# Patient Record
Sex: FEMALE | Race: WHITE | NOT HISPANIC OR LATINO | Employment: OTHER | URBAN - METROPOLITAN AREA
[De-identification: names, ages, dates, MRNs, and addresses within clinical notes are randomized per-mention and may not be internally consistent; named-entity substitution may affect disease eponyms.]

---

## 2017-01-11 ENCOUNTER — DOCUMENTATION ONLY (OUTPATIENT)
Dept: PHYSICAL THERAPY | Facility: CLINIC | Age: 67
End: 2017-01-11

## 2017-01-11 NOTE — PROGRESS NOTES
Abiola SUTHERLAND Gabriel  1950  Diagnosis - Left shoulder adhesive capsulitis manipulation under anesthesia Physical Therapy Progress Note  (Information from 08/24/16)   Signing Clinician's Name / Credentials   Signing clinician's name / credentials Vishnu Serrato, PT, DPT   Session Number   Session Number 13 (Start of Care Date - 6/30/2016)   Progress Note/Recertification   Progress Note Due Date 08/28/16   Progress Note Completed Date 08/24/16   Recertification Due Date 09/30/16   PT Medicare Only G-code   G-code Carry: Carrying, Moving & Handling Objects   Carry: Carrying, Moving & Handling Objects   Carry Current Status,  (eval/re-eval & every progress note) CI: 1-19% impairment   Current Carry Modifier Rationale Based on clinical judgment and the patient's ability to perform transfers, perform overhead activities, and perform exercises within PT session. Also based on the patient's subjective report of ability to perform functional activities.   Carry Goal,  (eval/re-eval, every progress note, & discharge) CI: 1-19% impairment   Adult Goals   PT Ortho Eval Goals 1;2;3;4;5   Ortho Goal 1   Goal Description wash hair using L UE  in typical manner with little or no difficulty in order to retrun to previous level of funciton   Target Date 08/30/16   Date Met 07/15/16   Ortho Goal 2   Goal Description dress self using L UE in typical manner with little or no difficulty in order to retrun to previous level of function   Target Date 08/30/16   Date Met 08/24/16   Ortho Goal 3   Goal Description Increase AROM to 170* shoulder flxn in order to reach items from upper cupboard  (Goal not met)   Target Date 09/30/16   Ortho Goal 4   Goal Description increase strengh in L UE to 4/5 in order to reach items on an upper shelf and return to previous level of function  (Goal not met)   Target Date 09/30/16   Ortho Goal 5   Goal Description Independent in HEP to continue to strengthen and stretch on own   Target Date 08/30/16    Date Met 07/15/16   Subjective Report   Subjective Report No issues with shoulder over the weekend. Still gets tired with overhead movements. 0/10 pain currently.    Objective Measure 2   Objective Measure AROM   Details IR - Right T6 and Left - T7 / Flexion - 156 (Right - 165) / Abduction - 155 (Right - 170)    Objective Measure 3   Objective Measure PROM   Details ER - 90    Objective Measure 4   Objective Measure Strength   Details Flexion - 4+/5 / Abduction - 4/5 / ER - 4/5   Therapeutic Procedure/exercise   Minutes 29   Skilled Intervention ROM and strengthening exericses.   Patient Response Performs all exercises well with minimal cueing required.   Treatment Detail Scaption x10 with 1# / Pulleys for flexion and abduction x5 minutes / ER in standing with Blue TB 2x15 / PROM for abduction x8 minutes / Wall walks in abduction x10 with 10 second holds / Lat stretch 3x30 seconds / Rows x25 with black TB     Plan   Home program Supine shoulder flexion / AROM for scaption / LS Stretch / Rows / Codman's / ER with cane for AAROM / Sidelying abduction / Wall walks   Plan for next session (From 1/11/2016) Discharge from PT   Comments   Comments  (From 1/11/2016) Pt was doing very well in terms of strength, ROM, and pain levels at her last visit. Pt had made progress towards all PT goals but still had not met all of them. Pt would have benefited from additional skilled PT in order to improve overall strength and ROM to complete all functional tasks. Pt has not returned to PT and it is assumed that she is doing well independently. Pt is discharged to [facility].      Referring Physician - Tyrell Pimentel

## 2017-06-01 ENCOUNTER — TRANSFERRED RECORDS (OUTPATIENT)
Dept: HEALTH INFORMATION MANAGEMENT | Facility: CLINIC | Age: 67
End: 2017-06-01

## 2017-06-26 ENCOUNTER — ANESTHESIA EVENT (OUTPATIENT)
Dept: SURGERY | Facility: CLINIC | Age: 67
DRG: 470 | End: 2017-06-26
Payer: MEDICARE

## 2017-06-26 NOTE — ANESTHESIA PREPROCEDURE EVALUATION
Anesthesia Evaluation     . Pt has had prior anesthetic.     No history of anesthetic complications          ROS/MED HX    ENT/Pulmonary:     (+)tobacco use, Past use , . Other pulmonary disease pulmonary nodules.    Neurologic:     (+)other neuro AVM    Cardiovascular:     (+) Dyslipidemia, ----. : . . . :. . Previous cardiac testing date:results:date: results:ECG reviewed date:8/18/13 results:SR date: results:          METS/Exercise Tolerance:  >4 METS   Hematologic:  - neg hematologic  ROS       Musculoskeletal:   (+) , , other musculoskeletal- left hip DJD      GI/Hepatic:     (+) GERD Other GI/Hepatic IBS      Renal/Genitourinary:         Endo:     (+) thyroid problem Other Endocrine Disorder Lupus.      Psychiatric:  - neg psychiatric ROS       Infectious Disease:  - neg infectious disease ROS       Malignancy:      - no malignancy   Other: Comment: glaucoma                    Physical Exam  Normal systems: cardiovascular, pulmonary and dental    Airway   Mallampati: I  TM distance: >3 FB  Neck ROM: full    Dental     Cardiovascular       Pulmonary                     Anesthesia Plan      History & Physical Review  History and physical reviewed and following examination; no interval change.    ASA Status:  3 .    NPO Status:  > 8 hours    Plan for Spinal and General with Intravenous and Propofol induction. Maintenance will be Balanced.    PONV prophylaxis:  Ondansetron (or other 5HT-3) and Dexamethasone or Solumedrol       Postoperative Care      Consents  Anesthetic plan, risks, benefits and alternatives discussed with:  Patient and Spouse..                          .

## 2017-06-28 ENCOUNTER — APPOINTMENT (OUTPATIENT)
Dept: GENERAL RADIOLOGY | Facility: CLINIC | Age: 67
DRG: 470 | End: 2017-06-28
Attending: ORTHOPAEDIC SURGERY
Payer: MEDICARE

## 2017-06-28 ENCOUNTER — ANESTHESIA (OUTPATIENT)
Dept: SURGERY | Facility: CLINIC | Age: 67
DRG: 470 | End: 2017-06-28
Payer: MEDICARE

## 2017-06-28 ENCOUNTER — HOSPITAL ENCOUNTER (INPATIENT)
Facility: CLINIC | Age: 67
LOS: 3 days | Discharge: HOME | DRG: 470 | End: 2017-07-01
Attending: ORTHOPAEDIC SURGERY | Admitting: ORTHOPAEDIC SURGERY
Payer: MEDICARE

## 2017-06-28 DIAGNOSIS — Z96.642 STATUS POST LEFT HIP REPLACEMENT: Primary | ICD-10-CM

## 2017-06-28 DIAGNOSIS — D64.9 ANEMIA, UNSPECIFIED TYPE: ICD-10-CM

## 2017-06-28 PROBLEM — M79.10 MUSCLE PAIN: Status: ACTIVE | Noted: 2017-06-28

## 2017-06-28 PROBLEM — M16.9 DEGENERATIVE JOINT DISEASE (DJD) OF HIP: Status: RESOLVED | Noted: 2017-06-28 | Resolved: 2017-06-28

## 2017-06-28 PROBLEM — M16.9 DEGENERATIVE JOINT DISEASE (DJD) OF HIP: Status: ACTIVE | Noted: 2017-06-28

## 2017-06-28 LAB
BASOPHILS # BLD AUTO: 0 10E9/L (ref 0–0.2)
BASOPHILS NFR BLD AUTO: 0.8 %
CREAT SERPL-MCNC: 0.96 MG/DL (ref 0.52–1.04)
DIFFERENTIAL METHOD BLD: NORMAL
EOSINOPHIL # BLD AUTO: 0.1 10E9/L (ref 0–0.7)
EOSINOPHIL NFR BLD AUTO: 1.7 %
ERYTHROCYTE [DISTWIDTH] IN BLOOD BY AUTOMATED COUNT: 12.6 % (ref 10–15)
GFR SERPL CREATININE-BSD FRML MDRD: 58 ML/MIN/1.7M2
HCT VFR BLD AUTO: 40 % (ref 35–47)
HGB BLD-MCNC: 13 G/DL (ref 11.7–15.7)
IMM GRANULOCYTES # BLD: 0 10E9/L (ref 0–0.4)
IMM GRANULOCYTES NFR BLD: 0.2 %
INR PPP: 0.97 (ref 0.86–1.14)
LYMPHOCYTES # BLD AUTO: 1.7 10E9/L (ref 0.8–5.3)
LYMPHOCYTES NFR BLD AUTO: 33.1 %
MCH RBC QN AUTO: 29.3 PG (ref 26.5–33)
MCHC RBC AUTO-ENTMCNC: 32.5 G/DL (ref 31.5–36.5)
MCV RBC AUTO: 90 FL (ref 78–100)
MONOCYTES # BLD AUTO: 0.4 10E9/L (ref 0–1.3)
MONOCYTES NFR BLD AUTO: 7.5 %
NEUTROPHILS # BLD AUTO: 3 10E9/L (ref 1.6–8.3)
NEUTROPHILS NFR BLD AUTO: 56.7 %
PLATELET # BLD AUTO: 213 10E9/L (ref 150–450)
RBC # BLD AUTO: 4.44 10E12/L (ref 3.8–5.2)
WBC # BLD AUTO: 5.2 10E9/L (ref 4–11)

## 2017-06-28 PROCEDURE — 37000008 ZZH ANESTHESIA TECHNICAL FEE, 1ST 30 MIN: Performed by: ORTHOPAEDIC SURGERY

## 2017-06-28 PROCEDURE — 85025 COMPLETE CBC W/AUTO DIFF WBC: CPT | Performed by: PHYSICIAN ASSISTANT

## 2017-06-28 PROCEDURE — 36000093 ZZH SURGERY LEVEL 4 1ST 30 MIN: Performed by: ORTHOPAEDIC SURGERY

## 2017-06-28 PROCEDURE — 25000132 ZZH RX MED GY IP 250 OP 250 PS 637: Mod: GY | Performed by: ORTHOPAEDIC SURGERY

## 2017-06-28 PROCEDURE — 40000985 XR PELVIS PORT 1/2 VW

## 2017-06-28 PROCEDURE — 25000132 ZZH RX MED GY IP 250 OP 250 PS 637: Mod: GY | Performed by: PHYSICIAN ASSISTANT

## 2017-06-28 PROCEDURE — 25000125 ZZHC RX 250: Performed by: NURSE ANESTHETIST, CERTIFIED REGISTERED

## 2017-06-28 PROCEDURE — 0SRB04A REPLACEMENT OF LEFT HIP JOINT WITH CERAMIC ON POLYETHYLENE SYNTHETIC SUBSTITUTE, UNCEMENTED, OPEN APPROACH: ICD-10-PCS | Performed by: ORTHOPAEDIC SURGERY

## 2017-06-28 PROCEDURE — 36000063 ZZH SURGERY LEVEL 4 EA 15 ADDTL MIN: Performed by: ORTHOPAEDIC SURGERY

## 2017-06-28 PROCEDURE — 25000128 H RX IP 250 OP 636: Performed by: PHYSICIAN ASSISTANT

## 2017-06-28 PROCEDURE — 40000305 ZZH STATISTIC PRE PROC ASSESS I: Performed by: ORTHOPAEDIC SURGERY

## 2017-06-28 PROCEDURE — 12000007 ZZH R&B INTERMEDIATE

## 2017-06-28 PROCEDURE — 85610 PROTHROMBIN TIME: CPT | Performed by: PHYSICIAN ASSISTANT

## 2017-06-28 PROCEDURE — 36415 COLL VENOUS BLD VENIPUNCTURE: CPT | Performed by: PHYSICIAN ASSISTANT

## 2017-06-28 PROCEDURE — C1776 JOINT DEVICE (IMPLANTABLE): HCPCS | Performed by: ORTHOPAEDIC SURGERY

## 2017-06-28 PROCEDURE — 25000128 H RX IP 250 OP 636: Performed by: NURSE ANESTHETIST, CERTIFIED REGISTERED

## 2017-06-28 PROCEDURE — 82565 ASSAY OF CREATININE: CPT | Performed by: ORTHOPAEDIC SURGERY

## 2017-06-28 PROCEDURE — A9270 NON-COVERED ITEM OR SERVICE: HCPCS | Mod: GY | Performed by: PHYSICIAN ASSISTANT

## 2017-06-28 PROCEDURE — 27210794 ZZH OR GENERAL SUPPLY STERILE: Performed by: ORTHOPAEDIC SURGERY

## 2017-06-28 PROCEDURE — 40000986 XR PELVIS PORT 1/2 VW

## 2017-06-28 PROCEDURE — 25000128 H RX IP 250 OP 636: Performed by: FAMILY MEDICINE

## 2017-06-28 PROCEDURE — 71000014 ZZH RECOVERY PHASE 1 LEVEL 2 FIRST HR: Performed by: ORTHOPAEDIC SURGERY

## 2017-06-28 PROCEDURE — 25000128 H RX IP 250 OP 636: Performed by: ORTHOPAEDIC SURGERY

## 2017-06-28 PROCEDURE — A9270 NON-COVERED ITEM OR SERVICE: HCPCS | Mod: GY | Performed by: ORTHOPAEDIC SURGERY

## 2017-06-28 PROCEDURE — 37000009 ZZH ANESTHESIA TECHNICAL FEE, EACH ADDTL 15 MIN: Performed by: ORTHOPAEDIC SURGERY

## 2017-06-28 DEVICE — IMP STEM FEMORAL HIP STRK ACCOLADE II 127DEG SZ 5 6721-0535: Type: IMPLANTABLE DEVICE | Site: HIP | Status: FUNCTIONAL

## 2017-06-28 DEVICE — IMP SHELL ACETABULUM HOWM 48MM 542-11-48D: Type: IMPLANTABLE DEVICE | Site: HIP | Status: FUNCTIONAL

## 2017-06-28 DEVICE — IMP HEAD FEMORAL STRK BIOLOX DELTA CERAMIC 32MM 0MM: Type: IMPLANTABLE DEVICE | Site: HIP | Status: FUNCTIONAL

## 2017-06-28 DEVICE — IMP SCR BONE STRK TORX 6.5X30MM CAN 2030-6530-1: Type: IMPLANTABLE DEVICE | Site: HIP | Status: FUNCTIONAL

## 2017-06-28 DEVICE — IMP SCR BONE STRK TORX 6.5X25MM CAN 2030-6525-1: Type: IMPLANTABLE DEVICE | Site: HIP | Status: FUNCTIONAL

## 2017-06-28 DEVICE — IMP LINER STRK TRIDENT X3 POLY 32MM 10DEG SZ D 623-10-32D: Type: IMPLANTABLE DEVICE | Site: HIP | Status: FUNCTIONAL

## 2017-06-28 RX ORDER — GLYCOPYRROLATE 0.2 MG/ML
INJECTION, SOLUTION INTRAMUSCULAR; INTRAVENOUS PRN
Status: DISCONTINUED | OUTPATIENT
Start: 2017-06-28 | End: 2017-06-28

## 2017-06-28 RX ORDER — METOCLOPRAMIDE 5 MG/1
5 TABLET ORAL EVERY 6 HOURS PRN
Status: DISCONTINUED | OUTPATIENT
Start: 2017-06-28 | End: 2017-07-01 | Stop reason: HOSPADM

## 2017-06-28 RX ORDER — BUPIVACAINE HYDROCHLORIDE 7.5 MG/ML
INJECTION, SOLUTION INTRASPINAL PRN
Status: DISCONTINUED | OUTPATIENT
Start: 2017-06-28 | End: 2017-06-28

## 2017-06-28 RX ORDER — CEFAZOLIN SODIUM 2 G/100ML
2 INJECTION, SOLUTION INTRAVENOUS
Status: COMPLETED | OUTPATIENT
Start: 2017-06-28 | End: 2017-06-28

## 2017-06-28 RX ORDER — METOPROLOL TARTRATE 1 MG/ML
1-2 INJECTION, SOLUTION INTRAVENOUS EVERY 5 MIN PRN
Status: DISCONTINUED | OUTPATIENT
Start: 2017-06-28 | End: 2017-06-28 | Stop reason: HOSPADM

## 2017-06-28 RX ORDER — CYCLOBENZAPRINE HCL 5 MG
5 TABLET ORAL 3 TIMES DAILY PRN
Status: DISCONTINUED | OUTPATIENT
Start: 2017-06-28 | End: 2017-07-01 | Stop reason: HOSPADM

## 2017-06-28 RX ORDER — SODIUM CHLORIDE, SODIUM LACTATE, POTASSIUM CHLORIDE, CALCIUM CHLORIDE 600; 310; 30; 20 MG/100ML; MG/100ML; MG/100ML; MG/100ML
INJECTION, SOLUTION INTRAVENOUS CONTINUOUS
Status: DISCONTINUED | OUTPATIENT
Start: 2017-06-28 | End: 2017-06-28 | Stop reason: HOSPADM

## 2017-06-28 RX ORDER — HYDROMORPHONE HYDROCHLORIDE 1 MG/ML
.3-.5 INJECTION, SOLUTION INTRAMUSCULAR; INTRAVENOUS; SUBCUTANEOUS EVERY 5 MIN PRN
Status: DISCONTINUED | OUTPATIENT
Start: 2017-06-28 | End: 2017-06-28 | Stop reason: HOSPADM

## 2017-06-28 RX ORDER — EPHEDRINE SULFATE 50 MG/ML
INJECTION, SOLUTION INTRAVENOUS PRN
Status: DISCONTINUED | OUTPATIENT
Start: 2017-06-28 | End: 2017-06-28

## 2017-06-28 RX ORDER — DIPHENHYDRAMINE HCL 12.5MG/5ML
12.5 LIQUID (ML) ORAL EVERY 6 HOURS PRN
Status: DISCONTINUED | OUTPATIENT
Start: 2017-06-28 | End: 2017-07-01 | Stop reason: HOSPADM

## 2017-06-28 RX ORDER — LATANOPROST 50 UG/ML
1 SOLUTION/ DROPS OPHTHALMIC DAILY
Status: DISCONTINUED | OUTPATIENT
Start: 2017-06-28 | End: 2017-07-01 | Stop reason: HOSPADM

## 2017-06-28 RX ORDER — DEXAMETHASONE SODIUM PHOSPHATE 4 MG/ML
INJECTION, SOLUTION INTRA-ARTICULAR; INTRALESIONAL; INTRAMUSCULAR; INTRAVENOUS; SOFT TISSUE PRN
Status: DISCONTINUED | OUTPATIENT
Start: 2017-06-28 | End: 2017-06-28

## 2017-06-28 RX ORDER — LIDOCAINE 40 MG/G
CREAM TOPICAL
Status: DISCONTINUED | OUTPATIENT
Start: 2017-06-28 | End: 2017-07-01 | Stop reason: HOSPADM

## 2017-06-28 RX ORDER — ONDANSETRON 4 MG/1
4 TABLET, ORALLY DISINTEGRATING ORAL EVERY 6 HOURS PRN
Status: DISCONTINUED | OUTPATIENT
Start: 2017-06-28 | End: 2017-07-01 | Stop reason: HOSPADM

## 2017-06-28 RX ORDER — ACETAMINOPHEN 325 MG/1
975 TABLET ORAL EVERY 8 HOURS
Status: COMPLETED | OUTPATIENT
Start: 2017-06-28 | End: 2017-07-01

## 2017-06-28 RX ORDER — DIPHENHYDRAMINE HYDROCHLORIDE 50 MG/ML
12.5 INJECTION INTRAMUSCULAR; INTRAVENOUS EVERY 6 HOURS PRN
Status: DISCONTINUED | OUTPATIENT
Start: 2017-06-28 | End: 2017-07-01 | Stop reason: HOSPADM

## 2017-06-28 RX ORDER — HYDROMORPHONE HYDROCHLORIDE 1 MG/ML
.3-.5 INJECTION, SOLUTION INTRAMUSCULAR; INTRAVENOUS; SUBCUTANEOUS
Status: DISCONTINUED | OUTPATIENT
Start: 2017-06-28 | End: 2017-07-01 | Stop reason: HOSPADM

## 2017-06-28 RX ORDER — ONDANSETRON 4 MG/1
4 TABLET, ORALLY DISINTEGRATING ORAL EVERY 30 MIN PRN
Status: DISCONTINUED | OUTPATIENT
Start: 2017-06-28 | End: 2017-06-28 | Stop reason: HOSPADM

## 2017-06-28 RX ORDER — ONDANSETRON 2 MG/ML
4 INJECTION INTRAMUSCULAR; INTRAVENOUS EVERY 6 HOURS PRN
Status: DISCONTINUED | OUTPATIENT
Start: 2017-06-28 | End: 2017-07-01 | Stop reason: HOSPADM

## 2017-06-28 RX ORDER — NALOXONE HYDROCHLORIDE 0.4 MG/ML
.1-.4 INJECTION, SOLUTION INTRAMUSCULAR; INTRAVENOUS; SUBCUTANEOUS
Status: DISCONTINUED | OUTPATIENT
Start: 2017-06-28 | End: 2017-06-28

## 2017-06-28 RX ORDER — ONDANSETRON 2 MG/ML
INJECTION INTRAMUSCULAR; INTRAVENOUS PRN
Status: DISCONTINUED | OUTPATIENT
Start: 2017-06-28 | End: 2017-06-28

## 2017-06-28 RX ORDER — AMOXICILLIN 250 MG
1-2 CAPSULE ORAL 2 TIMES DAILY
Status: DISCONTINUED | OUTPATIENT
Start: 2017-06-28 | End: 2017-07-01 | Stop reason: HOSPADM

## 2017-06-28 RX ORDER — HYDROXYZINE HYDROCHLORIDE 25 MG/1
25 TABLET, FILM COATED ORAL EVERY 6 HOURS PRN
Status: DISCONTINUED | OUTPATIENT
Start: 2017-06-28 | End: 2017-06-28 | Stop reason: HOSPADM

## 2017-06-28 RX ORDER — GABAPENTIN 100 MG/1
100 CAPSULE ORAL
Status: COMPLETED | OUTPATIENT
Start: 2017-06-28 | End: 2017-06-28

## 2017-06-28 RX ORDER — FENTANYL CITRATE 50 UG/ML
INJECTION, SOLUTION INTRAMUSCULAR; INTRAVENOUS PRN
Status: DISCONTINUED | OUTPATIENT
Start: 2017-06-28 | End: 2017-06-28

## 2017-06-28 RX ORDER — NALOXONE HYDROCHLORIDE 0.4 MG/ML
.1-.4 INJECTION, SOLUTION INTRAMUSCULAR; INTRAVENOUS; SUBCUTANEOUS
Status: DISCONTINUED | OUTPATIENT
Start: 2017-06-28 | End: 2017-07-01 | Stop reason: HOSPADM

## 2017-06-28 RX ORDER — FENTANYL CITRATE 50 UG/ML
25-50 INJECTION, SOLUTION INTRAMUSCULAR; INTRAVENOUS
Status: DISCONTINUED | OUTPATIENT
Start: 2017-06-28 | End: 2017-06-28 | Stop reason: HOSPADM

## 2017-06-28 RX ORDER — CEFAZOLIN SODIUM 1 G/3ML
1 INJECTION, POWDER, FOR SOLUTION INTRAMUSCULAR; INTRAVENOUS SEE ADMIN INSTRUCTIONS
Status: DISCONTINUED | OUTPATIENT
Start: 2017-06-28 | End: 2017-06-28 | Stop reason: HOSPADM

## 2017-06-28 RX ORDER — OXYCODONE HYDROCHLORIDE 5 MG/1
5-10 TABLET ORAL EVERY 4 HOURS PRN
Status: DISCONTINUED | OUTPATIENT
Start: 2017-06-28 | End: 2017-07-01 | Stop reason: HOSPADM

## 2017-06-28 RX ORDER — ALBUTEROL SULFATE 0.83 MG/ML
2.5 SOLUTION RESPIRATORY (INHALATION) EVERY 4 HOURS PRN
Status: DISCONTINUED | OUTPATIENT
Start: 2017-06-28 | End: 2017-06-28 | Stop reason: HOSPADM

## 2017-06-28 RX ORDER — ONDANSETRON 2 MG/ML
4 INJECTION INTRAMUSCULAR; INTRAVENOUS EVERY 30 MIN PRN
Status: DISCONTINUED | OUTPATIENT
Start: 2017-06-28 | End: 2017-06-28 | Stop reason: HOSPADM

## 2017-06-28 RX ORDER — PROCHLORPERAZINE MALEATE 5 MG
5 TABLET ORAL EVERY 6 HOURS PRN
Status: DISCONTINUED | OUTPATIENT
Start: 2017-06-28 | End: 2017-07-01 | Stop reason: HOSPADM

## 2017-06-28 RX ORDER — PROPOFOL 10 MG/ML
INJECTION, EMULSION INTRAVENOUS CONTINUOUS PRN
Status: DISCONTINUED | OUTPATIENT
Start: 2017-06-28 | End: 2017-06-28

## 2017-06-28 RX ORDER — LIDOCAINE 40 MG/G
CREAM TOPICAL
Status: DISCONTINUED | OUTPATIENT
Start: 2017-06-28 | End: 2017-06-28 | Stop reason: HOSPADM

## 2017-06-28 RX ORDER — GABAPENTIN 100 MG/1
100 CAPSULE ORAL 3 TIMES DAILY
Status: DISCONTINUED | OUTPATIENT
Start: 2017-06-28 | End: 2017-07-01 | Stop reason: HOSPADM

## 2017-06-28 RX ORDER — LIDOCAINE HYDROCHLORIDE 10 MG/ML
INJECTION, SOLUTION INFILTRATION; PERINEURAL PRN
Status: DISCONTINUED | OUTPATIENT
Start: 2017-06-28 | End: 2017-06-28

## 2017-06-28 RX ORDER — METOCLOPRAMIDE HYDROCHLORIDE 5 MG/ML
5 INJECTION INTRAMUSCULAR; INTRAVENOUS EVERY 6 HOURS PRN
Status: DISCONTINUED | OUTPATIENT
Start: 2017-06-28 | End: 2017-07-01 | Stop reason: HOSPADM

## 2017-06-28 RX ORDER — ACETAMINOPHEN 325 MG/1
650 TABLET ORAL EVERY 4 HOURS PRN
Status: DISCONTINUED | OUTPATIENT
Start: 2017-07-01 | End: 2017-07-01 | Stop reason: HOSPADM

## 2017-06-28 RX ORDER — OXYCODONE HCL 10 MG/1
10 TABLET, FILM COATED, EXTENDED RELEASE ORAL ONCE
Status: COMPLETED | OUTPATIENT
Start: 2017-06-28 | End: 2017-06-28

## 2017-06-28 RX ORDER — ATORVASTATIN CALCIUM 10 MG/1
10 TABLET, FILM COATED ORAL
Status: DISCONTINUED | OUTPATIENT
Start: 2017-06-28 | End: 2017-07-01 | Stop reason: HOSPADM

## 2017-06-28 RX ADMIN — OXYCODONE HYDROCHLORIDE 10 MG: 10 TABLET, FILM COATED, EXTENDED RELEASE ORAL at 12:00

## 2017-06-28 RX ADMIN — BUPIVACAINE HYDROCHLORIDE IN DEXTROSE 1.6 ML: 7.5 INJECTION, SOLUTION SUBARACHNOID at 13:08

## 2017-06-28 RX ADMIN — FENTANYL CITRATE 25 MCG: 50 INJECTION, SOLUTION INTRAMUSCULAR; INTRAVENOUS at 15:33

## 2017-06-28 RX ADMIN — OXYCODONE HYDROCHLORIDE 10 MG: 5 TABLET ORAL at 20:15

## 2017-06-28 RX ADMIN — GLYCOPYRROLATE 0.2 MG: 0.2 INJECTION, SOLUTION INTRAMUSCULAR; INTRAVENOUS at 13:49

## 2017-06-28 RX ADMIN — MIDAZOLAM HYDROCHLORIDE 2 MG: 1 INJECTION, SOLUTION INTRAMUSCULAR; INTRAVENOUS at 13:10

## 2017-06-28 RX ADMIN — SODIUM CHLORIDE, POTASSIUM CHLORIDE, SODIUM LACTATE AND CALCIUM CHLORIDE: 600; 310; 30; 20 INJECTION, SOLUTION INTRAVENOUS at 11:38

## 2017-06-28 RX ADMIN — ONDANSETRON 4 MG: 2 INJECTION INTRAMUSCULAR; INTRAVENOUS at 13:31

## 2017-06-28 RX ADMIN — LATANOPROST 1 DROP: 50 SOLUTION/ DROPS OPHTHALMIC at 18:46

## 2017-06-28 RX ADMIN — EPHEDRINE SULFATE 5 MG: 50 INJECTION, SOLUTION INTRAVENOUS at 14:48

## 2017-06-28 RX ADMIN — GABAPENTIN 100 MG: 100 CAPSULE ORAL at 20:15

## 2017-06-28 RX ADMIN — SODIUM CHLORIDE 1000 ML: 9 INJECTION, SOLUTION INTRAVENOUS at 21:47

## 2017-06-28 RX ADMIN — CEFAZOLIN SODIUM 1 G: 1 INJECTION, SOLUTION INTRAVENOUS at 18:41

## 2017-06-28 RX ADMIN — FENTANYL CITRATE 25 MCG: 50 INJECTION, SOLUTION INTRAMUSCULAR; INTRAVENOUS at 15:28

## 2017-06-28 RX ADMIN — ATORVASTATIN CALCIUM 10 MG: 10 TABLET, FILM COATED ORAL at 20:15

## 2017-06-28 RX ADMIN — ACETAMINOPHEN 975 MG: 325 TABLET, FILM COATED ORAL at 18:39

## 2017-06-28 RX ADMIN — EPINEPHRINE 0.2 MG: 1 INJECTION, SOLUTION INTRAMUSCULAR; SUBCUTANEOUS at 13:08

## 2017-06-28 RX ADMIN — LIDOCAINE HYDROCHLORIDE 3 ML: 10 INJECTION, SOLUTION INFILTRATION; PERINEURAL at 13:06

## 2017-06-28 RX ADMIN — MIDAZOLAM HYDROCHLORIDE 1 MG: 1 INJECTION, SOLUTION INTRAMUSCULAR; INTRAVENOUS at 13:26

## 2017-06-28 RX ADMIN — GABAPENTIN 100 MG: 100 CAPSULE ORAL at 12:00

## 2017-06-28 RX ADMIN — SODIUM CHLORIDE, POTASSIUM CHLORIDE, SODIUM LACTATE AND CALCIUM CHLORIDE: 600; 310; 30; 20 INJECTION, SOLUTION INTRAVENOUS at 13:45

## 2017-06-28 RX ADMIN — FENTANYL CITRATE 50 MCG: 50 INJECTION, SOLUTION INTRAMUSCULAR; INTRAVENOUS at 14:11

## 2017-06-28 RX ADMIN — CEFAZOLIN SODIUM 2 G: 2 INJECTION, SOLUTION INTRAVENOUS at 13:01

## 2017-06-28 RX ADMIN — EPHEDRINE SULFATE 5 MG: 50 INJECTION, SOLUTION INTRAVENOUS at 14:28

## 2017-06-28 RX ADMIN — DEXAMETHASONE SODIUM PHOSPHATE 8 MG: 4 INJECTION, SOLUTION INTRA-ARTICULAR; INTRALESIONAL; INTRAMUSCULAR; INTRAVENOUS; SOFT TISSUE at 13:31

## 2017-06-28 RX ADMIN — PROPOFOL 100 MCG/KG/MIN: 10 INJECTION, EMULSION INTRAVENOUS at 13:25

## 2017-06-28 RX ADMIN — FENTANYL CITRATE 100 MCG: 50 INJECTION, SOLUTION INTRAMUSCULAR; INTRAVENOUS at 13:04

## 2017-06-28 RX ADMIN — LIDOCAINE HYDROCHLORIDE 1 ML: 10 INJECTION, SOLUTION EPIDURAL; INFILTRATION; INTRACAUDAL; PERINEURAL at 11:37

## 2017-06-28 RX ADMIN — SENNOSIDES AND DOCUSATE SODIUM 1 TABLET: 8.6; 5 TABLET ORAL at 20:15

## 2017-06-28 RX ADMIN — MIDAZOLAM HYDROCHLORIDE 2 MG: 1 INJECTION, SOLUTION INTRAMUSCULAR; INTRAVENOUS at 13:01

## 2017-06-28 ASSESSMENT — LIFESTYLE VARIABLES: TOBACCO_USE: 1

## 2017-06-28 NOTE — ANESTHESIA POSTPROCEDURE EVALUATION
Patient: Abiola Rios    Procedure(s):  Left total hip arthroplasty choice anes - Wound Class: I-Clean    Diagnosis:Left hip DJD  Diagnosis Additional Information: No value filed.    Anesthesia Type:  No value filed.    Note:  Anesthesia Post Evaluation    Patient location during evaluation: Bedside  Patient participation: Able to fully participate in evaluation  Level of consciousness: awake  Pain management: adequate  Airway patency: patent  Cardiovascular status: stable  Respiratory status: nasal cannula  Hydration status: stable  PONV: none     Anesthetic complications: None          Last vitals:  Vitals:    06/28/17 1515 06/28/17 1524 06/28/17 1530   BP: 117/70  124/69   Resp: 12  12   Temp:      SpO2: 98% 98% 100%         Electronically Signed By: GA Cortes CRNA  June 28, 2017  3:50 PM

## 2017-06-28 NOTE — OP NOTE
Total Hip Arthoplasty Operative Note        PLAN:  Weight bearing status: Weight bearing as tolerated   Activity: Activity as tolerated  Patient may move about with assist as indicated or with supervision   Anticoagulation plan:                 Lovenox inpatient and then  mg daily at discharge  for 42 days  Follow up plan                           Follow up in 2 week(s)        Name: Abiola Rios    PCP: Lorna Hand    Procedure Date: 6/28/2017    Pre-operative diagnosis: Left hip DJD   Post-operative diagnosis: Same   Procedure: Total hip arthoplasty (Left)   Surgeon: Tyrell Pimentel MD     Assistant(s): Toni Dasilva PA-C   Anesthesia: Spinal Anesthesia   Estimated blood loss: 300 ml   Drains: Hemovac   Specimens: None       Findings: See full dictated operative note for details   Complications: None       Comments: See dictated operative report for full details         Procedure and Findings:    After being informed of the risks, benefits, and alternatives to the procedure, the patient desired to proceed. Brought to the main operating suite where she was placed under spinal anesthetic. She was positioned in an Innomed hip levy. The patient s Left lower extremity was prepped and draped in a manner appropriate for the procedure after a timeout verification step was complete.    Patient received 2 grams of intravenous Ancef. Toni Dasilva PA-C was present for the entire length of the case for the purposes of proper patient positioning, surgical exposure, and patient safety.    A minimally invasive posterior approach to the hip was taken. IT band was divided. Gluteus fibers divided and the Charnley retractor was placed. Attention was directed towards the external rotators. The piriformis was identified and tagged. Minimus was elevated. The capsule was teed and tagged. Hip leg length and offset were then determined using a Smith and Nephew device with a pin in the innominate and the hip was then dislocated.  The neck was resected using the Mary guide. Attention was directed toward the acetabulum. Retractors were placed. Soft tissues were resected. Sequential reaming from 44 to 49 mm was carried out. Good cancellous bleeding bed was demonstrated. The trail 48 mm cup was stable. The final 48 mm Tridient ANDINO PSL cup was selected and secured with 2 supplemental fixation screws. A 10 degree liner was placed. Attention was directed towards the femur. Box chisel, canal finder, sequential broaching up to 5 was carried out. Trial reductions were carried out and excellent range of motion and stability and restoration of leg length and offset was demonstrated with a 0,32 head. X-ray was obtained which demonstrated appropriate sizing and positioning of components. The trial components were then removed. The final 10 degree liner was secured. Inferior osteophytes were resected from the acetabulum. The implant 5, Accolade 2, 127 degree offset stem was the secured. Trial reductions were carried out and a 0, 32 mm head was selected. The hip was reduced. The joint was copiously irrigated. The joint capsule and piriformis tendon was repaired back to the greater trochanter through drill holes in bone. Soft tissues were infiltrated with anesthetic solution. Care was taken to avoid neurovascular structures.   The IT band was closed with running #1 running Ethibond and #1 Vicryl running stitch. Subcutaneous closure With layered 2-0 Vicryl, skin was closed with 3-0 Stratafix and Prineo. Sterile dressing was applied. Hip abductor pillow was placed. The patient returned to PAR in stable condition.       Tyrell Pimentel    Date: 6/28/2017 Time: 2:58 PM      CONFIDENTIALITY NOTICE This message and any included attachments are from [facility] and are intended only for the addressee. The information contained in this message is confidential and may constitute inside or non-public information under international, federal, or state  securities laws. Unauthorized forwarding, printing, copying, distribution, or use of such information is strictly prohibited and may be unlawful. If you are not the addressee, please promptly delete this message and notify the sender of the delivery error by e-mail.

## 2017-06-28 NOTE — IP AVS SNAPSHOT
MRN:[id number]                      After Visit Summary   6/28/2017    Abiola Rios    MRN: [id number]           Thank you!     Thank you for choosing [facility] for your care. Our goal is always to provide you with excellent care. Hearing back from our patients is one way we can continue to improve our services. Please take a few minutes to complete the written survey that you may receive in the mail after you visit with us. Thank you!        Patient Information     Date Of Birth          1950        Designated Caregiver       Most Recent Value    Caregiver    Will someone help with your care after discharge? yes    Name of designated caregiver curtis rios    Phone number of caregiver [phone]    Caregiver address [address] [place] [address]      About your hospital stay     You were admitted on:  June 28, 2017 You last received care in the:  [facility]    You were discharged on:  July 1, 2017        Reason for your hospital stay       Left total hip arthroplasty                  Who to Call     For medical emergencies, please call 911.  For non-urgent questions about your medical care, please call your primary care provider or clinic, [phone]  For questions related to your surgery, please call your surgery clinic        Attending Provider     Provider Desean Mike MD [facility], Tyrell HIRSCH MD Orthopedics       Primary Care Provider Office Phone # Fax #    Lorna Hand [phone]       When to contact your care team       Call your Orthopedic surgeon at [facility]  if you have any of the following: temperature greater than 100.4,  increased shortness of breath, increased drainage, increased swelling or increased pain.                  After Care Instructions     Activity       Your activity upon discharge: Activity as tolerated, no driving until off narcotic pain medication.            Diet        "Follow this diet upon discharge: Orders Placed This Encounter      Regular Diet Adult              Wound care and dressings       Instructions to care for your wound at home: as directed, daily dressing changes, ice to area for comfort, keep wound clean and dry and may get incision wet in shower but do not soak or scrub.                  Follow-up Appointments     Follow-up and recommended labs and tests       Follow up with  Toni Dasilva PA-C , at (location with clinic name or city) [facility], within 2 weeks to evaluate after surgery and for hospital follow- up.                  Additional Services     Physical Therapy Referral       *This therapy referral will be filtered to a centralized scheduling office at [facility] and the patient will receive a call to schedule an appointment at a [place] location most convenient for them. *     [facility] provides Physical Therapy evaluation and treatment and many specialty services across the [place] system.  If requesting a specialty program, please choose from the list below.    If you have not heard from the scheduling office within 2 business days, please call [phone] for all locations, with the exception of [place], please call [phone].  Treatment: Evaluation & Treatment  Special Instructions/Modalities: none  Special Programs: None    Please be aware that coverage of these services is subject to the terms and limitations of your health insurance plan.  Call member services at your health plan with any benefit or coverage questions.      **Note to Provider:  If you are referring outside of [place] for the therapy appointment, please list the name of the location in the \"special instructions\" above, print the referral and give to the patient to schedule the appointment.                  Further instructions from your care team       1.  Follow up with Toni Dasilva PA-C.  in 2 weeks for post op check and x " "rays as scheduled.  Call [phone] if appointment needed or questions  2.  Use pain medication as directed  3.  Keep incision clean, covered and dry until post op appointment.  You may shower and get incision wet if no drainage is present. You may change your dressing as needed. Minimize adhesives to be put on hip (including bandages).  Only use dry  guaze over Prineo glue tape and then apply minimal tape hold dressings in place.   4.  Continue physical therapy as soon as possible.  You will need to call a therapy department of your choice to arrange future appointments.  Your order for physical therapy is included in your discharge paperwork.   5.  Take Aspirin 325 mg  daily  for 42 days for anticoagulation          Pending Results     No orders found from 6/26/2017 to 6/29/2017.            Statement of Approval     Ordered          07/01/17 1337  I have reviewed and agree with all the recommendations and orders detailed in this document.  EFFECTIVE NOW     Approved and electronically signed by:  Jeremie Mendez MD             Admission Information     Date & Time Provider Department Dept. Phone    6/28/2017 Tyrell Pimentel MD [facility] [phone]      Your Vitals Were     Blood Pressure Pulse Temperature Respirations Height Weight    93/47 (BP Location: Right arm) 82 98.6  F (37  C) (Oral) 16 1.727 m (5' 8\") 59.4 kg (131 lb)    Pulse Oximetry BMI (Body Mass Index)                96% 19.92 kg/m2          MyChart Information     [facility] gives you secure access to your electronic health record. If you see a primary care provider, you can also send messages to your care team and make appointments. If you have questions, please call your primary care clinic.  If you do not have a primary care provider, please call [phone] and they will assist you.        Care EveryWhere ID     This is your Care EveryWhere ID. This could be used by other organizations to access your [facility]" records  [id number]        Equal Access to Services     KRISTAL FLORES : Hadii aad ku hadjunitolaw Still, waaxda luqadaha, qaybta kasonalifadia stern, syed velardeafsanehrandi schulz. So [facility] [phone].    ATENCIÓN: Si habla español, tiene a aguirre disposición servicios gratuitos de asistencia lingüística. Llame al [phone].    We comply with applicable federal civil rights laws and [place] laws. We do not discriminate on the basis of race, color, national origin, age, disability sex, sexual orientation or gender identity.               Review of your medicines      START taking        Dose / Directions    aspirin  MG EC tablet        Dose:  325 mg   Take 1 tablet (325 mg) by mouth daily   Quantity:  42 tablet   Refills:  0       ferrous sulfate 325 (65 FE) MG tablet   Commonly known as:  IRON        Dose:  325 mg   Take 1 tablet (325 mg) by mouth daily   Quantity:  100 tablet   Refills:  1       order for DME        Equipment being ordered: Walker Wheels () Treatment Diagnosis: L STEWART   Quantity:  1 Units   Refills:  0       oxyCODONE 5 MG IR tablet   Commonly known as:  ROXICODONE        Dose:  5-10 mg   Take 1-2 tablets (5-10 mg) by mouth every 4 hours as needed for moderate to severe pain   Quantity:  50 tablet   Refills:  0       scopolamine 72 hr patch   Commonly known as:  TRANSDERM   Notes to Patient:  May discontinue        Dose:  1 patch   Place 1 patch onto the skin every 72 hours   Quantity:  4 patch   Refills:  3       sennosides 8.6 MG tablet   Commonly known as:  SENOKOT        Dose:  1-2 tablet   Take 1-2 tablets by mouth 2 times daily as needed for constipation   Quantity:  60 tablet   Refills:  1         CONTINUE these medicines which have NOT CHANGED        Dose / Directions    atorvastatin 10 MG tablet   Commonly known as:  LIPITOR        Dose:  10 mg   Take 10 mg by mouth   Refills:  0       latanoprost 0.005 % ophthalmic solution   Commonly known as:  XALATAN        Dose:   1 drop   Place 1 drop into both eyes daily   Refills:  0       VITAMIN B 12 PO        Dose:  1000 mcg   Take 1,000 mcg by mouth daily   Refills:  0       VITAMIN D3 PO        Dose:  1000 Units   Take 1,000 Units by mouth daily   Refills:  0            Where to get your medicines      These medications were sent to [facility] [place], [place] - [address]  [address] [address]     Phone:  [phone]     aspirin  MG EC tablet    ferrous sulfate 325 (65 FE) MG tablet    scopolamine 72 hr patch    sennosides 8.6 MG tablet         Some of these will need a paper prescription and others can be bought over the counter. Ask your nurse if you have questions.     Bring a paper prescription for each of these medications     order for DME    oxyCODONE 5 MG IR tablet                Protect others around you: Learn how to safely use, store and throw away your medicines at www.disposemymeds.org.             Medication List: This is a list of all your medications and when to take them. Check marks below indicate your daily home schedule. Keep this list as a reference.      Medications           Morning Afternoon Evening Bedtime As Needed    aspirin  MG EC tablet   Take 1 tablet (325 mg) by mouth daily                                   atorvastatin 10 MG tablet   Commonly known as:  LIPITOR   Take 10 mg by mouth   Last time this was given:  10 mg on 6/30/2017  8:04 PM                                   ferrous sulfate 325 (65 FE) MG tablet   Commonly known as:  IRON   Take 1 tablet (325 mg) by mouth daily   Last time this was given:  325 mg on 7/1/2017  7:51 AM                                   latanoprost 0.005 % ophthalmic solution   Commonly known as:  XALATAN   Place 1 drop into both eyes daily   Last time this was given:  1 drop on 6/30/2017  8:05 PM                                   order for DME   Equipment being ordered: Walker Wheels () Treatment Diagnosis: L STEWART                                 oxyCODONE 5 MG IR tablet   Commonly known as:  ROXICODONE   Take 1-2 tablets (5-10 mg) by mouth every 4 hours as needed for moderate to severe pain   Last time this was given:  10 mg on 6/30/2017  6:26 AM                            Take as needed- may take at anytime       scopolamine 72 hr patch   Commonly known as:  TRANSDERM   Place 1 patch onto the skin every 72 hours   Last time this was given:  1 patch on 6/30/2017  9:38 AM   Notes to Patient:  May discontinue                                sennosides 8.6 MG tablet   Commonly known as:  SENOKOT   Take 1-2 tablets by mouth 2 times daily as needed for constipation                                      VITAMIN B 12 PO   Take 1,000 mcg by mouth daily                                   VITAMIN D3 PO   Take 1,000 Units by mouth daily

## 2017-06-28 NOTE — ANESTHESIA CARE TRANSFER NOTE
Patient: Abiola Rios    Procedure(s):  Left total hip arthroplasty choice anes - Wound Class: I-Clean    Diagnosis: Left hip DJD  Diagnosis Additional Information: No value filed.    Anesthesia Type:   No value filed.     Note:  Airway :Face Mask  Patient transferred to:PACU        Vitals: (Last set prior to Anesthesia Care Transfer)    CRNA VITALS  6/28/2017 1425 - 6/28/2017 1508      6/28/2017             Pulse: 78    SpO2: 98 %                Electronically Signed By: GA Cortes CRNA  June 28, 2017  3:08 PM

## 2017-06-28 NOTE — IP AVS SNAPSHOT
[facility]    [address] [address]    Phone:  [phone]    Fax:  [phone]                                       After Visit Summary   6/28/2017    Abiola Rios    MRN: [id number]           After Visit Summary Signature Page     I have received my discharge instructions, and my questions have been answered. I have discussed any challenges I see with this plan with the nurse or doctor.    ..........................................................................................................................................  Patient/Patient Representative Signature      ..........................................................................................................................................  Patient Representative Print Name and Relationship to Patient    ..................................................               ................................................  Date                                            Time    ..........................................................................................................................................  Reviewed by Signature/Title    ...................................................              ..............................................  Date                                                            Time

## 2017-06-28 NOTE — PROGRESS NOTES
"[facility] [facility] ADMISSION NOTE    Patient admitted to room 2305 at approximately 1600 via cart from surgery. Patient was accompanied by transport tech.     Verbal SBAR report received from PACU prior to patient arrival.     Patient trasferred to bed via air bebeto. Patient alert and oriented X 3. Pain is controlled without any medications. 0-10 Pain Scale: 4. Admission vital signs: Blood pressure 121/57, temperature 95.8  F (35.4  C), temperature source Oral, resp. rate 16, height 1.727 m (5' 8\"), weight 59.4 kg (131 lb), SpO2 94 %. Patient was oriented to plan of care, call light, bed controls, tv, telephone, bathroom and visiting hours.     The following safety risks were identified during admission: fall. Yellow risk band applied: YES.     Halle Lincoln    "

## 2017-06-28 NOTE — DISCHARGE INSTRUCTIONS
1.  Follow up with Toni Dasilva PA-C.  in 2 weeks for post op check and x rays as scheduled.  Call [phone] if appointment needed or questions  2.  Use pain medication as directed  3.  Keep incision clean, covered and dry until post op appointment.  You may shower and get incision wet if no drainage is present. You may change your dressing as needed. Minimize adhesives to be put on hip (including bandages).  Only use dry  guaze over Prineo glue tape and then apply minimal tape hold dressings in place.   4.  Continue physical therapy as soon as possible.  You will need to call a therapy department of your choice to arrange future appointments.  Your order for physical therapy is included in your discharge paperwork.   5.  Take Aspirin 325 mg  daily  for 42 days for anticoagulation

## 2017-06-29 ENCOUNTER — APPOINTMENT (OUTPATIENT)
Dept: PHYSICAL THERAPY | Facility: CLINIC | Age: 67
DRG: 470 | End: 2017-06-29
Attending: ORTHOPAEDIC SURGERY
Payer: MEDICARE

## 2017-06-29 ENCOUNTER — APPOINTMENT (OUTPATIENT)
Dept: OCCUPATIONAL THERAPY | Facility: CLINIC | Age: 67
DRG: 470 | End: 2017-06-29
Attending: ORTHOPAEDIC SURGERY
Payer: MEDICARE

## 2017-06-29 LAB — HGB BLD-MCNC: 10.8 G/DL (ref 11.7–15.7)

## 2017-06-29 PROCEDURE — A9270 NON-COVERED ITEM OR SERVICE: HCPCS | Mod: GY | Performed by: PHYSICIAN ASSISTANT

## 2017-06-29 PROCEDURE — 97535 SELF CARE MNGMENT TRAINING: CPT | Mod: GO

## 2017-06-29 PROCEDURE — 25000132 ZZH RX MED GY IP 250 OP 250 PS 637: Mod: GY | Performed by: ORTHOPAEDIC SURGERY

## 2017-06-29 PROCEDURE — 97116 GAIT TRAINING THERAPY: CPT | Mod: GP | Performed by: PHYSICAL THERAPIST

## 2017-06-29 PROCEDURE — A9270 NON-COVERED ITEM OR SERVICE: HCPCS | Mod: GY | Performed by: ORTHOPAEDIC SURGERY

## 2017-06-29 PROCEDURE — 12000007 ZZH R&B INTERMEDIATE

## 2017-06-29 PROCEDURE — 25000132 ZZH RX MED GY IP 250 OP 250 PS 637: Mod: GY | Performed by: PHYSICIAN ASSISTANT

## 2017-06-29 PROCEDURE — 25000128 H RX IP 250 OP 636: Performed by: ORTHOPAEDIC SURGERY

## 2017-06-29 PROCEDURE — 40000193 ZZH STATISTIC PT WARD VISIT: Performed by: PHYSICAL THERAPIST

## 2017-06-29 PROCEDURE — 99232 SBSQ HOSP IP/OBS MODERATE 35: CPT | Performed by: PHYSICIAN ASSISTANT

## 2017-06-29 PROCEDURE — 36415 COLL VENOUS BLD VENIPUNCTURE: CPT | Performed by: ORTHOPAEDIC SURGERY

## 2017-06-29 PROCEDURE — 85018 HEMOGLOBIN: CPT | Performed by: ORTHOPAEDIC SURGERY

## 2017-06-29 PROCEDURE — 97110 THERAPEUTIC EXERCISES: CPT | Mod: GP | Performed by: PHYSICAL THERAPIST

## 2017-06-29 PROCEDURE — 40000133 ZZH STATISTIC OT WARD VISIT

## 2017-06-29 PROCEDURE — 97161 PT EVAL LOW COMPLEX 20 MIN: CPT | Mod: GP | Performed by: PHYSICAL THERAPIST

## 2017-06-29 PROCEDURE — 97165 OT EVAL LOW COMPLEX 30 MIN: CPT | Mod: GO

## 2017-06-29 RX ADMIN — OXYCODONE HYDROCHLORIDE 10 MG: 5 TABLET ORAL at 12:53

## 2017-06-29 RX ADMIN — OXYCODONE HYDROCHLORIDE 10 MG: 5 TABLET ORAL at 16:54

## 2017-06-29 RX ADMIN — LATANOPROST 1 DROP: 50 SOLUTION/ DROPS OPHTHALMIC at 20:45

## 2017-06-29 RX ADMIN — OXYCODONE HYDROCHLORIDE 5 MG: 5 TABLET ORAL at 20:53

## 2017-06-29 RX ADMIN — SENNOSIDES AND DOCUSATE SODIUM 1 TABLET: 8.6; 5 TABLET ORAL at 08:01

## 2017-06-29 RX ADMIN — GABAPENTIN 100 MG: 100 CAPSULE ORAL at 20:44

## 2017-06-29 RX ADMIN — ATORVASTATIN CALCIUM 10 MG: 10 TABLET, FILM COATED ORAL at 20:44

## 2017-06-29 RX ADMIN — OXYCODONE HYDROCHLORIDE 10 MG: 5 TABLET ORAL at 08:01

## 2017-06-29 RX ADMIN — ENOXAPARIN SODIUM 40 MG: 40 INJECTION SUBCUTANEOUS at 12:53

## 2017-06-29 RX ADMIN — SENNOSIDES AND DOCUSATE SODIUM 2 TABLET: 8.6; 5 TABLET ORAL at 20:44

## 2017-06-29 RX ADMIN — CEFAZOLIN SODIUM 1 G: 1 INJECTION, SOLUTION INTRAVENOUS at 02:51

## 2017-06-29 RX ADMIN — ACETAMINOPHEN 975 MG: 325 TABLET, FILM COATED ORAL at 16:54

## 2017-06-29 RX ADMIN — ACETAMINOPHEN 975 MG: 325 TABLET, FILM COATED ORAL at 01:27

## 2017-06-29 RX ADMIN — GABAPENTIN 100 MG: 100 CAPSULE ORAL at 08:01

## 2017-06-29 RX ADMIN — ACETAMINOPHEN 975 MG: 325 TABLET, FILM COATED ORAL at 10:03

## 2017-06-29 NOTE — PLAN OF CARE
Problem: Goal Outcome Summary  Goal: Goal Outcome Summary  PT-  Evaluation completed this AM. Pt reporting very minimal pain.  Reviewed STEWART  precautions w/ mobility ;then pt completed all mobility- bed mobility  , transfers and gait w/ SBA ; ambulates in room w./ walker and SBA.  Very slight/ mild  c/o dizziness .   Pt has multiple steps to ambulate at home; will plan  on practicing steps 6/30 in preparation for Dc to home      REC- return home w/ assistance from  her spouse as needed. Pt to continue  indep. W/ HEP for further strengthening

## 2017-06-29 NOTE — PROGRESS NOTES
06/29/17 0800   Quick Adds   Type of Visit Initial PT Evaluation   Living Environment   Lives With spouse   Living Arrangements house   Home Accessibility stairs to enter home;stairs within home   Number of Stairs to Enter Home 3  (   Number of Stairs Within Home (17-18)   Stair Railings at Home inside, present on right side;outside, present on right side   Functional Level Prior   Ambulation 0-->independent   Transferring 0-->independent   Toileting 0-->independent   Bathing 0-->independent   Dressing 0-->independent   Eating 0-->independent   Communication 0-->understands/communicates without difficulty   Swallowing 0-->swallows foods/liquids without difficulty   Cognition 0 - no cognition issues reported   Fall history within last six months no   Prior Functional Level Comment PLOF-  Pt indep. with ambulation with no device/ pain. Pt reporting hx of torn labrum; would have i termittent  high pain/ catching sensation in left hip    General Information   Onset of Illness/Injury or Date of Surgery - Date 06/28/17   Referring Physician Comfort   Patient/Family Goals Statement Pt alert, repoprting goal of Dc to home ;a ddtional goal of returning to activites/ enjoys hiking    Pertinent History of Current Problem (include personal factors and/or comorbidities that impact the POC) Left hip DJD  , s/p Total hip arthoplasty (Left)   Precautions/Limitations left hip precautions   Weight-Bearing Status - RLE weight-bearing as tolerated   General Observations Pt alert, up in the chair ; reports minimal pain    General Info Comments / 57, spoke w/ PA nd RN prior to Rx session, as pt had syncopal episode last PM. Pt currently  reporting very mild dizziness only    Cognitive Status Examination   Orientation orientation to person, place and time   Pain Assessment   Patient Currently in Pain Yes, see Vital Sign flowsheet   Range of Motion (ROM)   ROM Comment Limited left hip AROM flexion, ABD w/ tracking for bed mobility  "   MMT: Hip, Rehab Eval   Hip Flexion - Left Side (3-/5) fair minus, left   Hip ABduction - Left Side (3-/5) fair minus, left   MMT: Knee, Rehab Eval   Knee Flexion - Left Side (3-/5) fair minus, left   Knee Extension - Left Side (3-/5) fair minus, left   Bed Mobility   Bed Mobility Comments Educated/ reviewed STEWART precautions w/ mobility.  Pt required   siitting> supine   Transfer Skills   Transfer Comments Sit to stand w/ walker, SBA and cues    Gait   Gait Comments Pt completes in room mobility w/ walker. SBA, instructed on WBAT , gait sequence   Balance   Balance Comments good dynamic standing balance w/ walker use   Sensory Examination   Sensory Perception no deficits were identified   General Therapy Interventions   Planned Therapy Interventions bed mobility training;gait training;ROM;strengthening;home program guidelines   Clinical Impression   Criteria for Skilled Therapeutic Intervention yes, treatment indicated   PT Diagnosis Total hip arthoplasty (Left)   Influenced by the following impairments PAin, decreased strength left LE   Functional limitations due to impairments ALtered mobility- decreasedi ndep. w/ bed mobility, transfers; decreased ambulatory status   Clinical Presentation Stable/Uncomplicated   Clinical Decision Making (Complexity) Low complexity   Therapy Frequency` daily   Predicted Duration of Therapy Intervention (days/wks) 2 days   Anticipated Equipment Needs at Discharge (Pt owns a walker, SEC,crutches for home use )   Anticipated Discharge Disposition Home with Assist   Risk & Benefits of therapy have been explained Yes   Patient, Family & other staff in agreement with plan of care Yes   [facility] AM-PAC  \"6 Clicks\" V.2 Basic Mobility Inpatient Short Form   1. Turning from your back to your side while in a flat bed without using bedrails? 3 - A Little   2. Moving from lying on your back to sitting on the side of a flat bed without using bedrails? 3 - A Little   3. Moving to and " from a bed to a chair (including a wheelchair)? 3 - A Little   4. Standing up from a chair using your arms (e.g., wheelchair, or bedside chair)? 3 - A Little   5. To walk in hospital room? 3 - A Little   6. Climbing 3-5 steps with a railing? 3 - A Little   Basic Mobility Raw Score (Score out of 24.Lower scores equate to lower levels of function) 18

## 2017-06-29 NOTE — PROGRESS NOTES
" 06/29/17 1000   Quick Adds   Type of Visit Initial Occupational Therapy Evaluation   Living Environment   Lives With spouse   Living Arrangements house   Home Accessibility stairs to enter home;stairs within home  (walk-in shower. )   Number of Stairs to Enter Home 3   Number of Stairs Within Home (17)   Stair Railings at Home inside, present on right side;outside, present on right side   Self-Care   Activity/Exercise/Self-Care Comment Has walker, reacher and SEC.    Functional Level Prior   Ambulation 0-->independent   Transferring 0-->independent   Toileting 0-->independent  (high rise toilet.)   Bathing 0-->independent   Dressing 0-->independent   Eating 0-->independent   Communication 0-->understands/communicates without difficulty   Swallowing 0-->swallows foods/liquids without difficulty   Cognition 0 - no cognition issues reported   Fall history within last six months no   General Information   Onset of Illness/Injury or Date of Surgery - Date 06/28/17   Referring Physician Tyrell Pimentel MD   Patient/Family Goals Statement To return home   Additional Occupational Profile Info/Pertinent History of Current Problem s/p L STEWART   Precautions/Limitations left hip precautions   Weight-Bearing Status - LLE weight-bearing as tolerated   Cognitive Status Examination   Orientation orientation to person, place and time   Level of Consciousness alert   Pain Assessment   Patient Currently in Pain Yes, see Vital Sign flowsheet  (\"3/10\")   Transfer Skill: Bed to Chair/Chair to Bed   Level of [place]: Bed to Chair stand-by assist   Physical Assist/Nonphysical Assist: Bed to Chair supervision   Weight-Bearing Restrictions weight-bearing as tolerated   Assistive Device - Transfer Skill Bed to Chair Chair to Bed Rehab Eval standard walker   Transfer Skill: Sit to Stand   Level of [place]: Sit/Stand stand-by assist   Physical Assist/Nonphysical Assist: Sit/Stand supervision   Transfer Skill: Sit to Stand " "weight-bearing as tolerated   Assistive Device for Transfer: Sit/Stand standard walker   Transfer Skill: Toilet Transfer   Level of [place]: Toilet stand-by assist   Physical Assist/Nonphysical Assist: Toilet supervision   Weight-Bearing Restrictions: Toilet weight-bearing as tolerated   Assistive Device standard walker   Upper Body Dressing   Level of [place]: Dress Upper Body independent   Lower Body Dressing   Level of [place]: Dress Lower Body stand-by assist   Physical Assist/Nonphysical Assist: Dress Lower Body verbal cues;1 person assist   Assistive Device reacher;sock-aid   Grooming   Level of [place]: Grooming stand-by assist   Instrumental Activities of Daily Living (IADL)   IADL Comments Pt's  can assist with IADLs upon discharge.    Activities of Daily Living Analysis   Impairments Contributing to Impaired Activities of Daily Living post surgical precautions   General Therapy Interventions   Planned Therapy Interventions ADL retraining   Clinical Impression   Criteria for Skilled Therapeutic Interventions Met yes, treatment indicated   OT Diagnosis decreased independence with ADLs   Influenced by the following impairments hip precautions   Assessment of Occupational Performance 1-3 Performance Deficits   Identified Performance Deficits lower body dressing, functional transfers   Clinical Decision Making (Complexity) Low complexity   Therapy Frequency daily   Predicted Duration of Therapy Intervention (days/wks) 2 days   Anticipated Equipment Needs at Discharge (has all recommended AE/DME)   Anticipated Discharge Disposition Home with Assist   Risks and Benefits of Treatment have been explained. Yes   Patient, Family & other staff in agreement with plan of care Yes   [facility] AM-PAC  \"6 Clicks\" Daily Activity Inpatient Short Form   1. Putting on and taking off regular lower body clothing? 3 - A Little   2. Bathing (including washing, rinsing, drying)? 3 - A Little   3. " Toileting, which includes using toilet, bedpan or urinal? 4 - None   4. Putting on and taking off regular upper body clothing? 4 - None   5. Taking care of personal grooming such as brushing teeth? 4 - None   6. Eating meals? 4 - None   Daily Activity Raw Score (Score out of 24.Lower scores equate to lower levels of function) 22   Total Evaluation Time   Total Evaluation Time (Minutes) 6

## 2017-06-29 NOTE — PLAN OF CARE
Problem: Goal Outcome Summary  Goal: Goal Outcome Summary  OT: Eval complete and treatment initiated. Pt able to complete supine <> sit, ambulates to/from bathroom, completes toilet transfer and lower body dressing using AE all with SBA and FWW. Follows precautions throughout therapy session.      REC: Home with assist from family as needed.

## 2017-06-29 NOTE — PLAN OF CARE
Ice to left hip area  Dressing left hip area C, D, I  hemovac patent  Intravenous infusing @ 50 cc/hr after bolus D/T syncopal episode  Patient requesting to leave laboy in until possibly mid morning  Patient completed all antibx's ordered with no difficulty

## 2017-06-29 NOTE — PROGRESS NOTES
Pt is now feeling better, denies nausea and no longer lightheaded.  Bolus infusing.  /60 at this time.  Pt reports having esposides of vasovagal in past with passing out at home.  Dr Koenig updated on BP now and and updated what pt reported. Pt's RN updated.

## 2017-06-29 NOTE — PROGRESS NOTES
[facility] Medicine Progress Note  Date of Service: 06/29/2017    Assessment & Plan   Abiola Rios is a 66 year old female who presented on 6/28/2017 for scheduled Procedure(s):  ARTHROPLASTY HIP by Tyrell Pimentel MD and is being followed by the hospital medicine service for co-management of acute and/or chronic perioperative medical problems.    S/p Procedure(s):  ARTHROPLASTY HIP  - POD #1  - pain control, wound cares, physical therapy, occupational therapy and DVT prophylaxis per orthopedic surgery service    Syncope POD #0  After dangling at bedside.  Symptomatic. BP of 78/50.  Given 1L NS fluid bolus over two hours.  BP now low normal (baseline appears 110 - 135/70 - 80) and symptoms have resolved.  Reports that she has syncopal episodes 2-3 times yearly.  - recommend outpatient follow up.  - encourage oral fluid intake    Hyperlipidemia LDL goal <100  - continue PTA atorvastatin      Glaucoma  - continue PTA Xalatan      DVT Prophylaxis: as per orthopedic surgery service - Defer to primary service  Code Status: Full Code    Lines: PIV, without edema/erythema   Bauman catheter: not indicated    Discussion: Medically, the patient appears stable.    Disposition: Anticipate discharge in 1-2 days.  The patient plans to discharge home.  Reportedly does not require PT.     Attestation:  I have reviewed today's vital signs, notes, medications, labs and imaging.    Breanne Jc PA-C      Interval History   Pain is well controlled.  Denies fever, CP, SOB, cough, abdominal pain, lower extremity pain/swelling.  Bauman just pulled this morning - has not urinated since that point in time.  Tolerating PT and OT. Passing flatus.    Physical Exam   Temp:  [95.8  F (35.4  C)-98.1  F (36.7  C)] 98  F (36.7  C)  Pulse:  [58-66] 66  Heart Rate:  [] 66  Resp:  [10-16] 16  BP: ()/(49-98) 101/57  SpO2:  [94 %-100 %] 100 %    Weights:   Vitals:    06/28/17 1141   Weight: 59.4 kg  (131 lb)    Body mass index is 19.92 kg/(m^2).    General: alert and oriented x4, in no acute distress  CV: Regular rate/rhythm, no appreciable murmur, rub, or gallop  Respiratory: CTA bilaterally, equal chest expansion  GI: Soft, nontender, normoactive BS  Skin: Warm and dry  Musculoskeletal: Negative homans bilaterally.  Non-tender to palpation of posterior calves. No edema to lower extremities.  Neuro: equal  strength    Data     Recent Labs  Lab 06/29/17  0702 06/28/17  1155   WBC  --  5.2   HGB 10.8* 13.0   MCV  --  90   PLT  --  213   INR  --  0.97   CR  --  0.96       No results for input(s): GLC, BGM in the last 168 hours.     Unresulted Labs Ordered in the Past 30 Days of this Admission     No orders found for last 61 day(s).           Imaging  Recent Results (from the past 24 hour(s))   XR Pelvis Port 1/2 Views    Narrative    PORTABLE PELVIS ONE VIEW   6/28/2017 2:19 PM    HISTORY: Intraoperative evaluation.    COMPARISON: An MRI on 11/18/2013.      Impression    IMPRESSION: There are surgical changes of a left total hip  arthroplasty in progress.     MADELINE SPANGLER MD   XR Pelvis Port 1/2 Views    Narrative    XR PELVIS PORT 1/2 VW 6/28/2017 3:29 PM    HISTORY: Post Op Hip Arthroplasty    COMPARISON: 6/28/2017 at 14:14    FINDINGS: Left hip arthroplasty hardware components appear well-seated  without radiographic evidence of complication.      Impression    IMPRESSION: Left hip arthroplasty.    NATAN AVERY MD        I reviewed all new labs and imaging results over the last 24 hours. I personally reviewed no images or EKG's today.    Medications        atorvastatin  10 mg Oral Daily at 8 pm     latanoprost  1 drop Both Eyes Daily     sodium chloride (PF)  3 mL Intracatheter Q8H     enoxaparin  40 mg Subcutaneous Q24H     acetaminophen  975 mg Oral Q8H     gabapentin  100 mg Oral TID     senna-docusate  1-2 tablet Oral BID       Breanne Jc PA-C

## 2017-06-29 NOTE — PLAN OF CARE
Problem: Goal Outcome Summary  Goal: Goal Outcome Summary  Outcome: Improving  Left hip pain controlled with Oxycodone, Tylenol, Neurontin, ice. Tolerating regular diet. Bauman dc at 1025, voiding without difficulty.  Pt ambulating in room and bathroom with assist 1, walker.

## 2017-06-29 NOTE — PROGRESS NOTES
Pt reports numbness and tingling gone in lower extremities after surgery.  Pt dangled at bedside tolerated and stood and took two steps.  Upon few steps pt felt dizzy, lightheaded and nausea.  Pt helped back in bed with two assist and laid down, HOB at 20 degrees.  Oxygen reapplied on pt and blood pressure checked was 100/53, then five minutes later 85/52. No loss of conscious.  Pt feels slightly better lying in bed.       2135: BP 85/49, HR 62  2139: 78/50, HR: 68    Dr Koenig and RN updated

## 2017-06-29 NOTE — PROGRESS NOTES
"[facility] Orthopaedics Progress Note      Post-operative Day: 1 Day Post-Op    Procedure(s):  Left total hip arthroplasty choice anes - Wound Class: I-Clean      Subjective:    Pain: minimal  aching and dull to L hip. Denies shooting pain, parasthesias, numbness and weakness.   denies Chest pain, SOB, O2 required: None  reports nausea upon standing last evening but has since resolved.   reports lightheadedness, dizziness upon standing last evening, is currently up to the chair and denies experiences of dizziness today.   BM -, passing gas -. Urinating well, Bauman in place: yes.       Objective:  Blood pressure 101/57, pulse 66, temperature 98  F (36.7  C), temperature source Oral, resp. rate 16, height 1.727 m (5' 8\"), weight 59.4 kg (131 lb), SpO2 100 %.    Patient Vitals for the past 24 hrs:   BP Temp Temp src Pulse Heart Rate Resp SpO2 Height Weight   06/29/17 0736 101/57 98  F (36.7  C) Oral 66 66 16 100 % - -   06/29/17 0312 99/54 96.6  F (35.9  C) Oral 58 - 16 100 % - -   06/28/17 2344 105/60 - - - 57 - - - -   06/28/17 2326 98/50 97.5  F (36.4  C) Oral - 60 14 100 % - -   06/28/17 2149 106/60 - - - 67 - 99 % - -   06/28/17 2138 (!) 78/50 - - - - - - - -   06/28/17 2135 (!) 85/49 - - - 62 - - - -   06/28/17 2130 (!) 85/52 - - - 57 16 98 % - -   06/28/17 2125 100/52 - - - - - - - -   06/28/17 1922 128/71 - - - 63 16 95 % - -   06/28/17 1700 113/62 97  F (36.1  C) - - 65 - 98 % - -   06/28/17 1624 - 96.5  F (35.8  C) Oral - - - - - -   06/28/17 1616 121/57 - - - 57 - - - -   06/28/17 1613 125/64 95.8  F (35.4  C) Oral - 68 16 94 % - -   06/28/17 1545 (!) 178/98 98.1  F (36.7  C) Oral - 128 12 99 % - -   06/28/17 1530 124/69 - - - 55 12 100 % - -   06/28/17 1524 - - - - - - 98 % - -   06/28/17 1515 117/70 - - - 69 12 98 % - -   06/28/17 1500 106/53 - - - 64 10 98 % - -   06/28/17 1457 106/59 96.1  F (35.6  C) Axillary - 65 10 98 % - -   06/28/17 1144 151/86 98.1  F (36.7  C) Oral - - - - - -   06/28/17 1141 - - " "Oral - - 16 97 % 1.727 m (5' 8\") 59.4 kg (131 lb)       Wt Readings from Last 4 Encounters:   06/28/17 59.4 kg (131 lb)   06/29/16 65.3 kg (144 lb)   04/14/16 68 kg (150 lb)   01/05/16 69.4 kg (153 lb)     General: Alert and orientated. No apparent distress. Non-labored breathing. Appropriate affect.   MSK: L hip: dressing Clean, dry, and intact. Skin intact, no ecchymosis, no erythema. nontender to palpation to incision site. ROM appropriate for post op. Distal neurovascularly intact. Compartments soft and non-tender. No calf pain. Homans negative. PF/DF and EHL intact.       Pertinent Labs   Lab Results: personally reviewed.     Recent Labs   Lab Test  06/29/17   0702  06/28/17   1155  07/21/14   0909  08/18/13   1640   INR   --   0.97   --    --    HGB  10.8*  13.0   --   13.4   HCT   --   40.0   --   41.0   MCV   --   90   --   90   PLT   --   213   --   239   NA   --    --    --   146*   CRP   --    --   <5.0   --          Procedure(s):  Left total hip arthroplasty choice anes - Wound Class: I-Clean  Plan: Anticoagulation protocol: Lovenox inpatient and then  mg daily at discharge  x 42  days            Pain medications:  oxycodone, tylenol and vistaril            Weight bearing status:  WBAT            Dressing Change: change dressing on POD 1, then every 2 days PRN            Disposition:  Home in 1-2 days             Follow up: 2 week with ALICIA            Continue cares and rehabilitation     Report completed by:  Madeleine Jara PA-C  Date: 6/29/2017  Time: 10:12 AM    "

## 2017-06-29 NOTE — CONSULTS
Care Transition Initial Assessment - RN    Reason For Consult: discharge planning   Met with: Patient.    DATA   Principal Problem:    S/P total hip arthroplasty, left  Active Problems:    Hyperlipidemia LDL goal <100    Glaucoma       Cognitive Status: awake, alert and oriented.  Primary Care Clinic Name: [facility]  Primary Care MD Name: Lorna Hand  Contact information and PCP information verified: Yes    Lives With: spouse  Living Arrangements: house  Quality Of Family Relationships: supportive, involved  Description of Support System: Supportive, Involved   Who is your support system?:    Support Assessment: Adequate family and caregiver support     Insurance concerns: No Insurance issues identified    ASSESSMENT  Patient currently receives the following services:  none        Identified issues/concerns regarding health management: No needs has husbands support and will follow exercises at home      PLAN  Financial costs for the patient include none .  Patient given options and choices for discharge no need .  Patient/family is agreeable to the plan?  Yes:   Patient anticipates discharging to home with  .        Patient anticipates needs for home equipment: Therapy working with that  Discharge Planner   Discharge Plans in progress: return home  Barriers to discharge plan: none  Plan/Disposition: Home   Appointments: will be made at discharge for 2 weeks    DEEP Jordan CTS RN    Care  (CTS) will continue to follow as needed.

## 2017-06-30 ENCOUNTER — APPOINTMENT (OUTPATIENT)
Dept: PHYSICAL THERAPY | Facility: CLINIC | Age: 67
DRG: 470 | End: 2017-06-30
Attending: ORTHOPAEDIC SURGERY
Payer: MEDICARE

## 2017-06-30 PROBLEM — D64.9 ANEMIA: Status: ACTIVE | Noted: 2017-06-30

## 2017-06-30 LAB
GLUCOSE SERPL-MCNC: 92 MG/DL (ref 70–99)
HGB BLD-MCNC: 9.5 G/DL (ref 11.7–15.7)
HGB BLD-MCNC: 9.7 G/DL (ref 11.7–15.7)
IRON SATN MFR SERPL: 8 % (ref 15–46)
IRON SERPL-MCNC: 14 UG/DL (ref 35–180)
TIBC SERPL-MCNC: 181 UG/DL (ref 240–430)

## 2017-06-30 PROCEDURE — 12000007 ZZH R&B INTERMEDIATE

## 2017-06-30 PROCEDURE — 36415 COLL VENOUS BLD VENIPUNCTURE: CPT | Performed by: ORTHOPAEDIC SURGERY

## 2017-06-30 PROCEDURE — 36415 COLL VENOUS BLD VENIPUNCTURE: CPT | Performed by: PHYSICIAN ASSISTANT

## 2017-06-30 PROCEDURE — A9270 NON-COVERED ITEM OR SERVICE: HCPCS | Mod: GY | Performed by: PHYSICIAN ASSISTANT

## 2017-06-30 PROCEDURE — 85018 HEMOGLOBIN: CPT | Performed by: ORTHOPAEDIC SURGERY

## 2017-06-30 PROCEDURE — 85018 HEMOGLOBIN: CPT | Performed by: PHYSICIAN ASSISTANT

## 2017-06-30 PROCEDURE — 25000132 ZZH RX MED GY IP 250 OP 250 PS 637: Mod: GY | Performed by: PHYSICIAN ASSISTANT

## 2017-06-30 PROCEDURE — 25000128 H RX IP 250 OP 636: Performed by: ORTHOPAEDIC SURGERY

## 2017-06-30 PROCEDURE — 82947 ASSAY GLUCOSE BLOOD QUANT: CPT | Performed by: ORTHOPAEDIC SURGERY

## 2017-06-30 PROCEDURE — 83550 IRON BINDING TEST: CPT | Performed by: PHYSICIAN ASSISTANT

## 2017-06-30 PROCEDURE — A9270 NON-COVERED ITEM OR SERVICE: HCPCS | Mod: GY | Performed by: ORTHOPAEDIC SURGERY

## 2017-06-30 PROCEDURE — 25000132 ZZH RX MED GY IP 250 OP 250 PS 637: Mod: GY | Performed by: ORTHOPAEDIC SURGERY

## 2017-06-30 PROCEDURE — 97116 GAIT TRAINING THERAPY: CPT | Mod: GP | Performed by: PHYSICAL THERAPIST

## 2017-06-30 PROCEDURE — 40000193 ZZH STATISTIC PT WARD VISIT: Performed by: PHYSICAL THERAPIST

## 2017-06-30 PROCEDURE — 25000125 ZZHC RX 250: Performed by: PHYSICIAN ASSISTANT

## 2017-06-30 PROCEDURE — 83540 ASSAY OF IRON: CPT | Performed by: PHYSICIAN ASSISTANT

## 2017-06-30 PROCEDURE — 99232 SBSQ HOSP IP/OBS MODERATE 35: CPT | Performed by: PHYSICIAN ASSISTANT

## 2017-06-30 RX ORDER — OXYCODONE HYDROCHLORIDE 5 MG/1
5-10 TABLET ORAL EVERY 4 HOURS PRN
Qty: 50 TABLET | Refills: 0 | Status: SHIPPED | OUTPATIENT
Start: 2017-06-30 | End: 2018-10-25

## 2017-06-30 RX ORDER — SCOLOPAMINE TRANSDERMAL SYSTEM 1 MG/1
1 PATCH, EXTENDED RELEASE TRANSDERMAL
Qty: 4 PATCH | Refills: 3 | Status: SHIPPED | OUTPATIENT
Start: 2017-06-30 | End: 2018-10-25

## 2017-06-30 RX ORDER — SENNOSIDES 8.6 MG
1-2 TABLET ORAL 2 TIMES DAILY PRN
Qty: 60 TABLET | Refills: 1 | Status: SHIPPED | OUTPATIENT
Start: 2017-06-30 | End: 2018-10-25

## 2017-06-30 RX ORDER — FERROUS SULFATE 325(65) MG
325 TABLET ORAL DAILY
Status: DISCONTINUED | OUTPATIENT
Start: 2017-06-30 | End: 2017-07-01 | Stop reason: HOSPADM

## 2017-06-30 RX ORDER — SCOLOPAMINE TRANSDERMAL SYSTEM 1 MG/1
1 PATCH, EXTENDED RELEASE TRANSDERMAL
Status: DISCONTINUED | OUTPATIENT
Start: 2017-06-30 | End: 2017-07-01 | Stop reason: HOSPADM

## 2017-06-30 RX ADMIN — ACETAMINOPHEN 975 MG: 325 TABLET, FILM COATED ORAL at 01:12

## 2017-06-30 RX ADMIN — SCOPOLAMINE 1 PATCH: 1 PATCH, EXTENDED RELEASE TRANSDERMAL at 09:38

## 2017-06-30 RX ADMIN — FERROUS SULFATE TAB 325 MG (65 MG ELEMENTAL FE) 325 MG: 325 (65 FE) TAB at 09:37

## 2017-06-30 RX ADMIN — ACETAMINOPHEN 975 MG: 325 TABLET, FILM COATED ORAL at 17:22

## 2017-06-30 RX ADMIN — GABAPENTIN 100 MG: 100 CAPSULE ORAL at 20:04

## 2017-06-30 RX ADMIN — SENNOSIDES AND DOCUSATE SODIUM 2 TABLET: 8.6; 5 TABLET ORAL at 20:04

## 2017-06-30 RX ADMIN — ENOXAPARIN SODIUM 40 MG: 40 INJECTION SUBCUTANEOUS at 13:07

## 2017-06-30 RX ADMIN — ATORVASTATIN CALCIUM 10 MG: 10 TABLET, FILM COATED ORAL at 20:04

## 2017-06-30 RX ADMIN — GABAPENTIN 100 MG: 100 CAPSULE ORAL at 13:07

## 2017-06-30 RX ADMIN — LATANOPROST 1 DROP: 50 SOLUTION/ DROPS OPHTHALMIC at 20:05

## 2017-06-30 RX ADMIN — OXYCODONE HYDROCHLORIDE 10 MG: 5 TABLET ORAL at 06:26

## 2017-06-30 RX ADMIN — GABAPENTIN 100 MG: 100 CAPSULE ORAL at 08:15

## 2017-06-30 RX ADMIN — ACETAMINOPHEN 975 MG: 325 TABLET, FILM COATED ORAL at 09:37

## 2017-06-30 RX ADMIN — SENNOSIDES AND DOCUSATE SODIUM 2 TABLET: 8.6; 5 TABLET ORAL at 08:15

## 2017-06-30 RX ADMIN — OXYCODONE HYDROCHLORIDE 10 MG: 5 TABLET ORAL at 01:12

## 2017-06-30 NOTE — PLAN OF CARE
Problem: Goal Outcome Summary  Goal: Goal Outcome Summary  OT: Pt met all IP OT goals 6/29/17. Request to review today. Pt feels comfortable and has no further ADL/IADL concerns at this time.      Occupational Therapy Discharge Summary     Reason for therapy discharge:    All goals and outcomes met, no further needs identified.     Progress towards therapy goal(s). See goals on Care Plan in [facility] electronic health record for goal details.  Goals met     Therapy recommendation(s):    No further OT recommended at this time.

## 2017-06-30 NOTE — PLAN OF CARE
Problem: Goal Outcome Summary  Goal: Goal Outcome Summary  Pt up with minimal assist & walker to the bathroom +vdg, no BM but is passing flatus. Taking scheduled tylenol & prn oxycodone for pain with good relief. Left hip dressing intact, ice on. Using IS & pulls to 3700. Temp 99.0 oral.

## 2017-06-30 NOTE — PROGRESS NOTES
"[facility] Orthopaedics Progress Note      Post-operative Day: 2 Days Post-Op    Procedure(s):  Left total hip arthroplasty choice anes - Wound Class: I-Clean      Subjective:    Pain: moderate  aching and dull to L hip. Denies shooting pain, parasthesias, numbness and weakness.   denies Chest pain, SOB, O2 required: None  Reports feeling lightheaded while sitting up in the chair earlier this morning. States her BP is low and feels her symptoms are related to her BP. She also reports feeling groggy. She returned to bed without resolution of these symptoms. She also feels chilled.  Reports having orthostatic hypotension in the past. Reports always having BP issues and lightheadedness symptoms during her menses which she is now menopausal. Denies any iron deficiency work up in the past.   Hgb down from 10.8 to 9.5 today.     Objective:  Blood pressure 100/54, pulse 62, temperature 98.4  F (36.9  C), temperature source Oral, resp. rate 18, height 1.727 m (5' 8\"), weight 59.4 kg (131 lb), SpO2 92 %.    Patient Vitals for the past 24 hrs:   BP Temp Temp src Pulse Heart Rate Resp SpO2   06/30/17 0724 100/54 - - - - - -   06/30/17 0700 (!) 89/50 98.4  F (36.9  C) Oral - 64 18 92 %   06/30/17 0321 95/44 99.4  F (37.4  C) Oral - 75 18 95 %   06/29/17 2338 103/48 99  F (37.2  C) Oral - 72 18 94 %   06/29/17 1515 98/57 97.8  F (36.6  C) Oral 62 62 18 97 %   06/29/17 1122 114/57 97.5  F (36.4  C) Oral 58 58 18 97 %       Wt Readings from Last 4 Encounters:   06/28/17 59.4 kg (131 lb)   06/29/16 65.3 kg (144 lb)   04/14/16 68 kg (150 lb)   01/05/16 69.4 kg (153 lb)     General: Alert and orientated. Non-labored breathing. Appropriate affect. Appears to be in mild distress due to her nausea and lightheadedness.   MSK: L hip: dressing Clean, dry, and intact. Skin intact, no ecchymosis, no erythema. nontender to palpation to incision site. ROM appropriate for post op. Distal neurovascularly intact. Compartments soft and " non-tender. No calf pain. Homans negative. PF/DF and EHL intact.       Pertinent Labs   Lab Results: personally reviewed.     Recent Labs   Lab Test  06/30/17   0635  06/29/17   0702  06/28/17   1155  07/21/14   0909  08/18/13   1640   INR   --    --   0.97   --    --    HGB  9.5*  10.8*  13.0   --   13.4   HCT   --    --   40.0   --   41.0   MCV   --    --   90   --   90   PLT   --    --   213   --   239   NA   --    --    --    --   146*   CRP   --    --    --   <5.0   --          Procedure(s):  Left total hip arthroplasty choice anes - Wound Class: I-Clean  Plan: Anticoagulation protocol: Lovenox inpatient and then  mg daily at discharge  x 42  days            Pain medications:  oxycodone, tylenol and vistaril            Weight bearing status:  WBAT            Dressing Change: change dressing on POD 1, then every 2 days PRN            Disposition:  Home tomorrow             Follow up: 2 week with ALICIA            Continue cares and rehabilitation             Ordered Iron supplement pending Iron labs     Report completed by:  Madeleine Jara PA-C  Date: 6/30/2017  Time: 9:48 AM

## 2017-06-30 NOTE — PLAN OF CARE
Problem: Goal Outcome Summary  Goal: Goal Outcome Summary  PT-  Pt not feeling well( mild dizziness, fatigued, shivering), but requesting to practice stair amb.  BP check prior to mobility in sitting 121/ 59.  Pt amb. 100 feet x2; able to ambulate up./ down steps w/ use of railing, SEc and SBA w/o difficulty, initial cues for sequence.   Pt performing HEP indep. except SAQ, so performed x 10 w/ assist to place pillow.  Pt to amb. W/ nursing staff later today; will practice stair amb 7/1 again to ensure confidence      REC- home w/ assistance from her spouse as needed. Pt to continue indep. W/ HEP  For further strengthening

## 2017-06-30 NOTE — PLAN OF CARE
"Problem: Hip Replacement, Total (Adult)  Goal: Signs and Symptoms of Listed Potential Problems Will be Absent or Manageable (Hip Replacement, Total)  Signs and symptoms of listed potential problems will be absent or manageable by discharge/transition of care (reference Hip Replacement, Total (Adult) CPG).   Outcome: Improving  Pt has been up SBA with walker. Is ambulating without difficultly. Dressing is CD&I- good CMS. This morning pt was c/o fatigue, nausea and lightheaded. Hgb was 9.5 at 0600. MD added a iron supplement, scopolamine patch (behind right ear) and recheck hgb at 1200. Hgb is now 9.7. Pt is feeling better but still fatigued. Orthostatic blood pressures were completed those were WNL- no variation. Declined Oxycodone all day, as she felt this may be contributing to her not feeling well. Pain is under control with scheduled tylenol. Plan is to return home tomorrow if stable. /54 (BP Location: Left arm)  Pulse 69  Temp 99.4  F (37.4  C) (Oral)  Resp 18  Ht 1.727 m (5' 8\")  Wt 59.4 kg (131 lb)  SpO2 92%  BMI 19.92 kg/m2  Angela Hand RN BSN          "

## 2017-06-30 NOTE — PROGRESS NOTES
[facility] Medicine Progress Note  Date of Service: 06/30/2017    Assessment & Plan   Abiola Rios is a 66 year old female who presented on 6/28/2017 for scheduled Procedure(s):  ARTHROPLASTY HIP by Tyrell Pimentel MD and is being followed by the hospital medicine service for co-management of acute and/or chronic perioperative medical problems.    S/p Procedure(s):  ARTHROPLASTY HIP  - POD #2  - pain control, wound cares, physical therapy, occupational therapy and DVT prophylaxis per orthopedic surgery service    Acute Anemia secondary to blood loss, expected  Intermittent Symptomatic Hypotension POD #2  Pre-op hbg 13.8.  10.8 on POD #1.  9.5 on POD #2  - iron, TIBC ordered by ortho, pending  - per ortho, to start ferrous sulfate supplementation today  - repeat hbg at noon   - orthostatics ordered (will use these to assess need for continuous IVF)  - encourage oral fluid intake  - encourage oral sodium intake     Syncope POD #0  After dangling at bedside.  Symptomatic. BP of 78/50.  Given 1L NS fluid bolus over two hours.  BP low normal (baseline appears 110 - 135/70 - 80) POD #1 and symptoms had resolved.  Reports that she has syncopal episodes 2-3 times yearly.  - recommend outpatient follow up.     Hyperlipidemia LDL goal <100  - continue PTA atorvastatin      Glaucoma  - continue PTA Xalatan        DVT Prophylaxis: as per orthopedic surgery service - Defer to primary service  Code Status: Full Code     Lines: PIV, without edema/erythema   Bauman catheter: not indicated     Discussion: Medically, the patient appears stable.     Disposition: Anticipate discharge today or tomorrow.  The patient plans to discharge home.  Reportedly does not require PT.      Attestation:  I have reviewed today's vital signs, notes, medications, labs and imaging.     Breanne Jc PA-C      Interval History   Reports an episode of lightheadedness today after eating breakfast.  Reports mild  nausea, but did not vomit.  Was still able to participate in PT/OT.  Does not feel ready for discharge.  Pain to her knee is well controlled.  Reports intermittent chills, but no fever.  Denies CP, SOB, cough, abdominal pain, dysuria, LE edema/swelling.  Passing flatus.  Voiding freely.  Has not had a BM yet.  Has no history of black/bloody stools.      Physical Exam   Temp:  [97.5  F (36.4  C)-99.4  F (37.4  C)] 98.4  F (36.9  C)  Pulse:  [58-62] 62  Heart Rate:  [58-75] 64  Resp:  [18] 18  BP: ()/(44-57) 100/54  SpO2:  [92 %-97 %] 92 %    Weights:   Vitals:    06/28/17 1141   Weight: 59.4 kg (131 lb)    Body mass index is 19.92 kg/(m^2).    General: alert and oriented x4, in no acute distress  CV: Regular rate/rhythm, no appreciable murmur, rub, or gallop  Respiratory: CTA bilaterally, equal chest expansion  GI: Soft, nontender, normoactive BS  Skin: Warm and dry  Musculoskeletal: Negative homans bilaterally.  Non-tender to palpation of posterior calves. No edema to lower extremities.  Neuro: equal  strength    Data     Recent Labs  Lab 06/30/17  0635 06/29/17  0702 06/28/17  1155   WBC  --   --  5.2   HGB 9.5* 10.8* 13.0   MCV  --   --  90   PLT  --   --  213   INR  --   --  0.97   CR  --   --  0.96   GLC 92  --   --          Recent Labs  Lab 06/30/17  0635   GLC 92        Unresulted Labs Ordered in the Past 30 Days of this Admission     No orders found from 4/29/2017 to 6/29/2017.           Imaging  No results found for this or any previous visit (from the past 24 hour(s)).     I reviewed all new labs and imaging results over the last 24 hours. I personally reviewed no images or EKG's today.    Medications        atorvastatin  10 mg Oral Daily at 8 pm     latanoprost  1 drop Both Eyes Daily     sodium chloride (PF)  3 mL Intracatheter Q8H     enoxaparin  40 mg Subcutaneous Q24H     acetaminophen  975 mg Oral Q8H     gabapentin  100 mg Oral TID     senna-docusate  1-2 tablet Oral BID       Breanne  ALICIA Jc

## 2017-07-01 ENCOUNTER — APPOINTMENT (OUTPATIENT)
Dept: PHYSICAL THERAPY | Facility: CLINIC | Age: 67
DRG: 470 | End: 2017-07-01
Attending: ORTHOPAEDIC SURGERY
Payer: MEDICARE

## 2017-07-01 VITALS
RESPIRATION RATE: 16 BRPM | HEART RATE: 82 BPM | BODY MASS INDEX: 19.85 KG/M2 | TEMPERATURE: 98.6 F | HEIGHT: 68 IN | SYSTOLIC BLOOD PRESSURE: 93 MMHG | OXYGEN SATURATION: 96 % | DIASTOLIC BLOOD PRESSURE: 47 MMHG | WEIGHT: 131 LBS

## 2017-07-01 LAB
HGB BLD-MCNC: 10.1 G/DL (ref 11.7–15.7)
PLATELET # BLD AUTO: 190 10E9/L (ref 150–450)

## 2017-07-01 PROCEDURE — 25000132 ZZH RX MED GY IP 250 OP 250 PS 637: Mod: GY | Performed by: PHYSICIAN ASSISTANT

## 2017-07-01 PROCEDURE — 85018 HEMOGLOBIN: CPT | Performed by: ORTHOPAEDIC SURGERY

## 2017-07-01 PROCEDURE — 40000193 ZZH STATISTIC PT WARD VISIT: Performed by: PHYSICAL THERAPIST

## 2017-07-01 PROCEDURE — 36415 COLL VENOUS BLD VENIPUNCTURE: CPT | Performed by: ORTHOPAEDIC SURGERY

## 2017-07-01 PROCEDURE — 85049 AUTOMATED PLATELET COUNT: CPT | Performed by: ORTHOPAEDIC SURGERY

## 2017-07-01 PROCEDURE — 97110 THERAPEUTIC EXERCISES: CPT | Mod: GP | Performed by: PHYSICAL THERAPIST

## 2017-07-01 PROCEDURE — A9270 NON-COVERED ITEM OR SERVICE: HCPCS | Mod: GY | Performed by: FAMILY MEDICINE

## 2017-07-01 PROCEDURE — A9270 NON-COVERED ITEM OR SERVICE: HCPCS | Mod: GY | Performed by: ORTHOPAEDIC SURGERY

## 2017-07-01 PROCEDURE — 99232 SBSQ HOSP IP/OBS MODERATE 35: CPT | Performed by: FAMILY MEDICINE

## 2017-07-01 PROCEDURE — 25000132 ZZH RX MED GY IP 250 OP 250 PS 637: Mod: GY | Performed by: FAMILY MEDICINE

## 2017-07-01 PROCEDURE — 25000132 ZZH RX MED GY IP 250 OP 250 PS 637: Mod: GY | Performed by: ORTHOPAEDIC SURGERY

## 2017-07-01 RX ORDER — POLYETHYLENE GLYCOL 3350 17 G/17G
17 POWDER, FOR SOLUTION ORAL DAILY
Status: DISCONTINUED | OUTPATIENT
Start: 2017-07-01 | End: 2017-07-01 | Stop reason: HOSPADM

## 2017-07-01 RX ORDER — FERROUS SULFATE 325(65) MG
325 TABLET ORAL DAILY
Qty: 100 TABLET | Refills: 1 | Status: SHIPPED | OUTPATIENT
Start: 2017-07-01 | End: 2018-10-25

## 2017-07-01 RX ADMIN — POLYETHYLENE GLYCOL 3350 17 G: 17 POWDER, FOR SOLUTION ORAL at 12:58

## 2017-07-01 RX ADMIN — ACETAMINOPHEN 975 MG: 325 TABLET, FILM COATED ORAL at 01:41

## 2017-07-01 RX ADMIN — FERROUS SULFATE TAB 325 MG (65 MG ELEMENTAL FE) 325 MG: 325 (65 FE) TAB at 07:51

## 2017-07-01 RX ADMIN — ACETAMINOPHEN 975 MG: 325 TABLET, FILM COATED ORAL at 09:49

## 2017-07-01 RX ADMIN — SENNOSIDES AND DOCUSATE SODIUM 2 TABLET: 8.6; 5 TABLET ORAL at 07:51

## 2017-07-01 RX ADMIN — GABAPENTIN 100 MG: 100 CAPSULE ORAL at 07:51

## 2017-07-01 NOTE — PROGRESS NOTES
"[facility] Orthopaedics Progress Note      Post-operative Day: 3 Days Post-Op    Procedure(s):  Left total hip arthroplasty choice anes - Wound Class: I-Clean      Subjective:    Pain: minimal  Chest pain, SOB:  No  Lightheaded when sitting, had these symptoms prior to surg. History of B12 deficiency. Labs sig low Fe, TIBC appreciated. Fevers appear resolved. No cough, chills, dysuria, urgency/frequency. No n/v. Voiding. No BM, +flatus.    Objective:  Blood pressure 97/58, pulse 82, temperature 99  F (37.2  C), temperature source Oral, resp. rate 16, height 1.727 m (5' 8\"), weight 59.4 kg (131 lb), SpO2 94 %.    Patient Vitals for the past 24 hrs:   BP Temp Temp src Pulse Heart Rate Resp SpO2   07/01/17 0748 97/58 99  F (37.2  C) Oral - 73 16 -   07/01/17 0253 - 100.3  F (37.9  C) - - - - -   06/30/17 2328 103/55 100  F (37.8  C) Oral - 75 18 94 %   06/30/17 2115 - 97.9  F (36.6  C) Oral - - - -   06/30/17 1602 112/54 100.6  F (38.1  C) Oral 82 - 16 96 %   06/30/17 1130 - 99.4  F (37.4  C) Oral 69 - 18 -       Wt Readings from Last 4 Encounters:   06/28/17 59.4 kg (131 lb)   06/29/16 65.3 kg (144 lb)   04/14/16 68 kg (150 lb)   01/05/16 69.4 kg (153 lb)         Motor function, sensation, and circulation intact   Yes  Wound status: incisions are clean dry and intact. Yes  Calf tenderness: Bilateral  No    Pertinent Labs   Lab Results: personally reviewed.     Recent Labs   Lab Test  07/01/17   0655  06/30/17   1226  06/30/17   0635   06/28/17   1155  07/21/14   0909  08/18/13   1640   INR   --    --    --    --   0.97   --    --    HGB  10.1*  9.7*  9.5*   < >  13.0   --   13.4   HCT   --    --    --    --   40.0   --   41.0   MCV   --    --    --    --   90   --   90   PLT  190   --    --    --   213   --   239   NA   --    --    --    --    --    --   146*   CRP   --    --    --    --    --   <5.0   --     < > = values in this interval not displayed.       Plan: Anticoagulation protocol: Lovenox inpatient and then "  mg daily at discharge  x 42  days            Pain medications:  oxycodone, tylenol and vistaril            Weight bearing status:  WBAT            Disposition:  Home with OP PT. Low grade fever, likely atelectasis, resolving. Has anemia that should be followed up by PMD. May dc today if pain controlled, PT passed and fevers remain resolved.            Continue cares and rehabilitation     Report completed by:  Jaja Cabello PA-C, ALICIA  Date: 7/1/2017  Time: 8:35 AM

## 2017-07-01 NOTE — PROGRESS NOTES
[facility] Hospitalist Progress Note           Assessment and Plan:     ARTHROPLASTY HIP  - POD #3  - pain control, wound cares, physical therapy, occupational therapy and DVT prophylaxis per orthopedic surgery service     Acute Anemia secondary to blood loss, expected  Intermittent Symptomatic Hypotension POD #2  6/30/17 -- Pre-op hbg 13.8.  10.8 on POD #1.  9.5 on POD #2  - iron, TIBC ordered by ortho, pending  - per ortho, to start ferrous sulfate supplementation today  7/1/2017 -- hemoglobin improving - agree that this was most likely due to acute perioperative blood loss, although iron studies being low already somewhat surprising  - recommend recheck of hemoglobin and iron studies at follow-up with primary care provider to confirm normalization - if remaining anemic/iron deficient would recommend further work-up as outpatient.        Syncope POD #0  6/30/17 -- After dangling at bedside.  Symptomatic. BP of 78/50.  Given 1L NS fluid bolus over two hours.  BP low normal (baseline appears 110 - 135/70 - 80) POD #1 and symptoms had resolved.  Reports that she has syncopal episodes 2-3 times yearly.  - recommend outpatient follow up.  7/1/2017 -- doing well with no further issues since.  Blood pressure still mildly low but stable and patient has been up and about independently extensively here and would like to discharge - I don't see any reason to anticipate this worsening - hemoglobin stable, good intake, no evidence of ongoing blood loss and pain doing well with minimal anticipate pain medication use going forward - as such I think after discussion with the patient, with physical therapy and with ortho that discharge today would be all right.   Final dspo per ortho.        Hyperlipidemia LDL goal <100  - continue PTA atorvastatin      Glaucoma  - continue PTA Xalatan      DVT Prophylaxis: as per orthopedic surgery service - Defer to ortho     Code Status: Full Code    Disposition  Home today per ortho.              Interval History:   Doing well.  No pain.  Has been up and about and doing well with this including being up and about her room and around the hallways on her own.  Still slightly light-headed when she first stands, but then is stable and does well.  Discussed with physical therapy - she confirmed the slight light-headednes but agreed that if the patient feels safe she is ok for discharge home today.  Patient feels like she's doing great, she knows how to be cautious standing and has been independent and all over the [facility] floor on her own and thinks she would do fine at home and would like to discharge today.  No chest pain, palpitations or dyspnea.  No fever or chills, single borderline temp.  No urinary symptoms.  Overall says she feels good today.  Offered to keep her for one more day but patient would like to discharge.               Review of Systems:    ROS: 10 point ROS neg other than the symptoms noted above in the HPI.             Medications:   Current active medications and PTA medications reviewed, see medication list for details.            Physical Exam:   Vitals were reviewed  Patient Vitals for the past 24 hrs:   BP Temp Temp src Pulse Heart Rate Resp SpO2   17 1146 93/47 98.6  F (37  C) Oral - 74 16 96 %   17 1010 - 98.9  F (37.2  C) Oral - - - -   17 0748 97/58 99  F (37.2  C) Oral - 73 16 -   17 0253 - 100.3  F (37.9  C) - - - - -   17 2328 103/55 100  F (37.8  C) Oral - 75 18 94 %   17 2115 - 97.9  F (36.6  C) Oral - - - -   17 1602 112/54 100.6  F (38.1  C) Oral 82 - 16 96 %       Temperatures:  Current - Temp: 98.6  F (37  C); Max - Temp  Av.3  F (37.4  C)  Min: 97.9  F (36.6  C)  Max: 100.6  F (38.1  C)  Respiration range: Resp  Av.5  Min: 16  Max: 18  Pulse range: Pulse  Av  Min: 82  Max: 82  Blood pressure range: Systolic (24hrs), Av , Min:93 , Max:112   ; Diastolic (24hrs), Av, Min:47, Max:58    Pulse oximetry  range: SpO2  Av.3 %  Min: 94 %  Max: 96 %  I/O last 3 completed shifts:  In: 1260 [P.O.:1260]  Out: 800 [Urine:800]    Intake/Output Summary (Last 24 hours) at 17 1236  Last data filed at 17 1732   Gross per 24 hour   Intake              320 ml   Output                0 ml   Net              320 ml     EXAM:  General: awake and alert, NAD, oriented x 3  Head: normocephalic  Neck: unremarkable, no lymphadenopathy   HEENT: oropharynx pink and moist    Heart: Regular rate and rhythm, no murmurs, rubs, or gallops  Lungs: clear to auscultation bilaterally with good air movement throughout  Abdomen: soft, non-tender, no masses or organomegaly  Extremities: no edema in lower extremities   Skin unremarkable.               Data:     Results for orders placed or performed during the hospital encounter of 17 (from the past 24 hour(s))   Platelet count   Result Value Ref Range    Platelet Count 190 150 - 450 10e9/L   Hemoglobin   Result Value Ref Range    Hemoglobin 10.1 (L) 11.7 - 15.7 g/dL           Attestation:  I have reviewed today's vital signs, notes, medications, labs and imaging.  Amount of time performed on this daily note: 25 minutes.     Jeremie Mendez MD, MD

## 2017-07-01 NOTE — DISCHARGE SUMMARY
[facility] Orthopedics Discharge Summary                                  [facility]     CJ MONTIEL [id number]   Age: 66 year old  PCP: Lorna Hand, [phone] 1950     Date of Admission:  6/28/2017  Date of Discharge::  7/1/2017  Discharge Physician:  Jaja Cabello PA-C    Code status:  Full Code    Admission Information:  Admission Diagnosis:  Left hip DJD  Degenerative joint disease (DJD) of hip    Post-Operative Day: 3 Days Post-Op     Reason for admission:  The patient was admitted for the following:Procedure(s) (LRB):  ARTHROPLASTY HIP (Left)    Principal Problem:    S/P total hip arthroplasty, left  Active Problems:    Hyperlipidemia LDL goal <100    Glaucoma    Anemia      Allergies:  Nka [no known allergies]    Following the procedure noted above the patient was transferred to the post-op floor and started on:    Therapy:  physical therapy and occupational therapy  Anticoagulation Plan: Lovenox inpatient and then  mg daily at discharge  for 42 days  Pain Management: oxycodone and tylenol  Weight bearing status: Weight bearing as tolerated     The patient was followed and co-managed by the hospitalist service during the inpatient treatment course  Complications:  None  Consultations:  Hospitalist     Pertinent Labs   Lab Results: personally reviewed.     Recent Labs   Lab Test  07/01/17   0655  06/30/17   1226  06/30/17   0635   06/28/17   1155  07/21/14   0909  08/18/13   1640   INR   --    --    --    --   0.97   --    --    HGB  10.1*  9.7*  9.5*   < >  13.0   --   13.4   HCT   --    --    --    --   40.0   --   41.0   MCV   --    --    --    --   90   --   90   PLT  190   --    --    --   213   --   239   NA   --    --    --    --    --    --   146*   CRP   --    --    --    --    --   <5.0   --     < > = values in this interval not displayed.          Discharge Information:  Condition at discharge: Stable  Discharge destination:  Discharged to home      Medications at discharge:  Current Discharge Medication List      START taking these medications    Details   scopolamine (TRANSDERM) 72 hr patch Place 1 patch onto the skin every 72 hours  Qty: 4 patch, Refills: 3    Associated Diagnoses: Status post left hip replacement      aspirin  MG EC tablet Take 1 tablet (325 mg) by mouth daily  Qty: 42 tablet, Refills: 0    Associated Diagnoses: Status post left hip replacement      oxyCODONE (ROXICODONE) 5 MG IR tablet Take 1-2 tablets (5-10 mg) by mouth every 4 hours as needed for moderate to severe pain  Qty: 50 tablet, Refills: 0    Associated Diagnoses: Status post left hip replacement      sennosides (SENOKOT) 8.6 MG tablet Take 1-2 tablets by mouth 2 times daily as needed for constipation  Qty: 60 tablet, Refills: 1    Associated Diagnoses: Status post left hip replacement      order for DME Equipment being ordered: Walker Wheels ()  Treatment Diagnosis: L STEWART  Qty: 1 Units, Refills: 0    Associated Diagnoses: Status post left hip replacement         CONTINUE these medications which have NOT CHANGED    Details   atorvastatin (LIPITOR) 10 MG tablet Take 10 mg by mouth      Cyanocobalamin (VITAMIN B 12 PO) Take 1,000 mcg by mouth daily      latanoprost (XALATAN) 0.005 % ophthalmic solution Place 1 drop into both eyes daily      Cholecalciferol (VITAMIN D3 PO) Take 1,000 Units by mouth daily                        Follow-Up Care:  Patient should be seen in the office in 14 days by the Orthopedic Surgeon/Physician Assistant.  Call [phone] for appointment or questions.    Jaja Cabello PA-C

## 2017-07-01 NOTE — PLAN OF CARE
"Problem: Goal Outcome Summary  Goal: Goal Outcome Summary  PT:  Patient is up and moving throughout her hospital room, in and out of bathroom and RW through the halls.  Reports she has had hypotension and is no longer on pain medications but feels some \"lightheaded feelings\" occasionally.  Continues with low grade fever and is appropriate to stay another night.  Is ready to progress to SEC Sunday .  Appropriate to see one time per day all goals met or exceeded.    Comments:   Recommendation:  Home and appropriate to try SEC Sunday prior to d/c  "

## 2017-07-01 NOTE — PLAN OF CARE
Problem: Hip Replacement, Total (Adult)  Goal: Signs and Symptoms of Listed Potential Problems Will be Absent or Manageable (Hip Replacement, Total)  Signs and symptoms of listed potential problems will be absent or manageable by discharge/transition of care (reference Hip Replacement, Total (Adult) CPG).   Outcome: Improving  Patient up with walker and minimal assistance in room.   Dressing changed due to ice bag leaking, scant amount of dried drainage on old dressing. Incision approximated, minimal redness.   Pain controlled with scheduled Tylenol.  Patient had temperature of 100.3 F.  Using incentive spirometer.   Blood pressure stable.   Using call light appropriately.

## 2017-07-01 NOTE — PROGRESS NOTES
[facility] DISCHARGE NOTE    Patient discharged to home at 1:55 PM via wheel chair. Accompanied by spouse and staff. Discharge instructions reviewed with patient and spouse, opportunity offered to ask questions. Prescriptions sent to patients preferred pharmacy. All belongings sent with patient.    China Hand

## 2017-07-01 NOTE — PROGRESS NOTES
"   07/01/17 1100   Signing Clinician's Name / Credentials   Signing clinician's name / credentials Josiane Jacobs PT DPT [facility]   Quick Adds   Rehab Discipline PT   Gait Training   Minutes of Treatment (Gait Training) 15   Treatment Detail Patient reports walking freely throughouther room, halls and all moibilty without RN assist at this time.  No concerns   Wakled with PT today and demonstrates ability to progress to SEC.  Will wait until Sunday due to still feels somewhat light headed.   [place] Level (Gait Training) contact guard   Physical Assistance Level (Gait Training) set-up required   Weight Bearing (Gait Training) weight-bearing as tolerated   Assistive Device (Gait Training) rolling walker   Gait Distance 400 ft   Pattern Analysis (Gait Training) swing-through gait   Therapeutic Exercise   Minutes of Treatment 5   Treatment Detail Verbal review.  Worked on heel [id number] and approriate level of hip flexion that is within precatuoins   No concerns.  Performs independnet and goal met.   Additional Documentation   Rehab Comments Progress to SEC tomorow.  Declines afternoon session and agrees to try SEC prior to d/c   PT Plan SEC trial SUnday   [facility] AM-PAC  \"6 Clicks\" V.2 Basic Mobility Inpatient Short Form   1. Turning from your back to your side while in a flat bed without using bedrails? 3 - A Little   2. Moving from lying on your back to sitting on the side of a flat bed without using bedrails? 3 - A Little   3. Moving to and from a bed to a chair (including a wheelchair)? 3 - A Little   4. Standing up from a chair using your arms (e.g., wheelchair, or bedside chair)? 3 - A Little   5. To walk in hospital room? 3 - A Little   6. Climbing 3-5 steps with a railing? 3 - A Little   Basic Mobility Raw Score (Score out of 24.Lower scores equate to lower levels of function) 18   Total Session Time   Total Session Time (minutes) 20 minutes     "

## 2017-07-03 LAB
IRON SATN MFR SERPL: 8 % (ref 15–46)
IRON SERPL-MCNC: 14 UG/DL (ref 35–180)
TIBC SERPL-MCNC: 181 UG/DL (ref 240–430)

## 2018-07-13 ENCOUNTER — HOSPITAL ENCOUNTER (OUTPATIENT)
Dept: MRI IMAGING | Facility: CLINIC | Age: 68
End: 2018-07-13
Attending: ORTHOPAEDIC SURGERY
Payer: MEDICARE

## 2018-07-13 ENCOUNTER — HOSPITAL ENCOUNTER (OUTPATIENT)
Dept: GENERAL RADIOLOGY | Facility: CLINIC | Age: 68
Discharge: HOME | End: 2018-07-13
Attending: ORTHOPAEDIC SURGERY | Admitting: ORTHOPAEDIC SURGERY
Payer: MEDICARE

## 2018-07-13 DIAGNOSIS — M25.511 RIGHT SHOULDER PAIN: ICD-10-CM

## 2018-07-13 PROCEDURE — 25000128 H RX IP 250 OP 636: Performed by: RADIOLOGY

## 2018-07-13 PROCEDURE — 25500064 ZZH RX 255 OP 636: Performed by: RADIOLOGY

## 2018-07-13 PROCEDURE — 27210995 ZZH RX 272: Performed by: RADIOLOGY

## 2018-07-13 PROCEDURE — 73222 MRI JOINT UPR EXTREM W/DYE: CPT | Mod: RT

## 2018-07-13 PROCEDURE — A9579 GAD-BASE MR CONTRAST NOS,1ML: HCPCS | Performed by: RADIOLOGY

## 2018-07-13 PROCEDURE — 23350 INJECTION FOR SHOULDER X-RAY: CPT

## 2018-07-13 PROCEDURE — 25000125 ZZHC RX 250: Performed by: RADIOLOGY

## 2018-07-13 RX ORDER — LIDOCAINE HYDROCHLORIDE 10 MG/ML
5 INJECTION, SOLUTION EPIDURAL; INFILTRATION; INTRACAUDAL; PERINEURAL ONCE
Status: COMPLETED | OUTPATIENT
Start: 2018-07-13 | End: 2018-07-13

## 2018-07-13 RX ORDER — TRIAMCINOLONE ACETONIDE 40 MG/ML
40 INJECTION, SUSPENSION INTRA-ARTICULAR; INTRAMUSCULAR ONCE
Status: COMPLETED | OUTPATIENT
Start: 2018-07-13 | End: 2018-07-13

## 2018-07-13 RX ORDER — EPINEPHRINE 1 MG/ML
1 INJECTION, SOLUTION, CONCENTRATE INTRAVENOUS ONCE
Status: COMPLETED | OUTPATIENT
Start: 2018-07-13 | End: 2018-07-13

## 2018-07-13 RX ORDER — IOPAMIDOL 408 MG/ML
10 INJECTION, SOLUTION INTRATHECAL ONCE
Status: COMPLETED | OUTPATIENT
Start: 2018-07-13 | End: 2018-07-13

## 2018-07-13 RX ORDER — ACYCLOVIR 200 MG/1
30 CAPSULE ORAL ONCE
Status: COMPLETED | OUTPATIENT
Start: 2018-07-13 | End: 2018-07-13

## 2018-07-13 RX ADMIN — TRIAMCINOLONE ACETONIDE 80 MG: 40 INJECTION, SUSPENSION INTRA-ARTICULAR; INTRAMUSCULAR at 14:56

## 2018-07-13 RX ADMIN — IOPAMIDOL 1 ML: 408 INJECTION, SOLUTION INTRATHECAL at 14:54

## 2018-07-13 RX ADMIN — EPINEPHRINE 0.05 MG: 1 INJECTION, SOLUTION, CONCENTRATE INTRAVENOUS at 14:54

## 2018-07-13 RX ADMIN — LIDOCAINE HYDROCHLORIDE 5 ML: 10 INJECTION, SOLUTION EPIDURAL; INFILTRATION; INTRACAUDAL; PERINEURAL at 14:52

## 2018-07-13 RX ADMIN — GADOPENTETATE DIMEGLUMINE 0.05 ML: 469.01 INJECTION INTRAVENOUS at 14:53

## 2018-07-13 RX ADMIN — SODIUM CHLORIDE 7 ML: 9 INJECTION, SOLUTION INTRAMUSCULAR; INTRAVENOUS; SUBCUTANEOUS at 14:57

## 2018-07-13 NOTE — PROGRESS NOTES
RADIOLOGY PROCEDURE NOTE  Patient name: Abiola Rios  MRN: [id number]  : 1950    Pre-procedure diagnosis: Pain.  Post-procedure diagnosis: Same    Procedure Date/Time: 2018  2:51 PM  Procedure: Right shoulder intraarticular MARY/ steroid injection for MRI to follow.  Estimated blood loss: None  Specimen(s) collected:  None.  The patient tolerated the procedure well with no immediate complications.    See imaging dictation for procedural details.    Provider name: Jeremie Okeefe  Assistant(s):None

## 2018-10-25 ENCOUNTER — OFFICE VISIT (OUTPATIENT)
Dept: DERMATOLOGY | Facility: CLINIC | Age: 68
End: 2018-10-25
Payer: COMMERCIAL

## 2018-10-25 VITALS — DIASTOLIC BLOOD PRESSURE: 85 MMHG | SYSTOLIC BLOOD PRESSURE: 154 MMHG | HEART RATE: 58 BPM | OXYGEN SATURATION: 99 %

## 2018-10-25 DIAGNOSIS — D18.00 ANGIOMA: ICD-10-CM

## 2018-10-25 DIAGNOSIS — L82.1 SEBORRHEIC KERATOSIS: ICD-10-CM

## 2018-10-25 DIAGNOSIS — L57.0 AK (ACTINIC KERATOSIS): ICD-10-CM

## 2018-10-25 DIAGNOSIS — L81.4 LENTIGO: ICD-10-CM

## 2018-10-25 DIAGNOSIS — D22.9 NEVUS: Primary | ICD-10-CM

## 2018-10-25 DIAGNOSIS — L82.0 INFLAMED SEBORRHEIC KERATOSIS: ICD-10-CM

## 2018-10-25 PROCEDURE — 17000 DESTRUCT PREMALG LESION: CPT | Mod: 59 | Performed by: PHYSICIAN ASSISTANT

## 2018-10-25 PROCEDURE — 99204 OFFICE O/P NEW MOD 45 MIN: CPT | Mod: 25 | Performed by: PHYSICIAN ASSISTANT

## 2018-10-25 PROCEDURE — 17110 DESTRUCTION B9 LES UP TO 14: CPT | Performed by: PHYSICIAN ASSISTANT

## 2018-10-25 PROCEDURE — 17003 DESTRUCT PREMALG LES 2-14: CPT | Performed by: PHYSICIAN ASSISTANT

## 2018-10-25 NOTE — PROGRESS NOTES
HPI:   Abiola Rios is a 67 year old female who presents for Full skin cancer screening.  chief complaint  Last Skin Exam: n/a       1st Baseline: YES  Personal HX of Skin Cancer: no   Personal HX of Malignant Melanoma: no   Family HX of Skin Cancer / Malignant Melanoma: no  Personal HX of Atypical Moles:   no  Risk factors: limitied sun exposure - burns easily/history of blistering burns, previous tanning bed exposure  New / Changing lesions:Yes - few on face and right arm,   Social History: lives in [place].  Cares for 3 parents, mother has dementia is still living in home.  On review of systems, there are no further skin complaints, patient is feeling otherwise well.  See patient intake sheet.  ROS of the following were done and are negative: Constitutional, Eyes, Ears, Nose,   Mouth, Throat, Cardiovascular, Respiratory, GI, Genitourinary, Musculoskeletal,   Psychiatric, Endocrine, Allergic/Immunologic.    This document serves as a record of the services and decisions personally performed and made by Simona Vega PA-C. It was created on her behalf by Enid Jean, a trained medical scribe. The creation of this document is based the provider's statements to the medical scribe.  Enid Jean October 25, 2018 3:54 PM     PHYSICAL EXAM:   /85  Pulse 58  SpO2 99%  Skin exam performed as follows: Type 2 skin. Mood appropriate  Alert and Oriented X 3. Well developed, well nourished in no distress.  General appearance: Normal  Head including face: Normal  Eyes: conjunctiva and lids: Normal  Mouth: Lips, teeth, gums: Normal  Neck: Normal  Chest-breast/axillae: Normal  Back: Normal  Spleen and liver: Normal  Cardiovascular: Exam of peripheral vascular system by observation for swelling, varicosities, edema: Normal  Genitalia: groin, buttocks: Normal  Extremities: digits/nails (clubbing): Normal  Eccrine and Apocrine glands: Normal  Right upper extremity: Normal  Left upper extremity: Normal  Right lower  extremity: Normal  Left lower extremity: Normal  Skin: Scalp and body hair: See below    Pt deferred exam of breasts, groin, buttocks: No    Other physical findings:  1. Multiple pigmented macules on extremities and trunk  2. Multiple pigmented macules on face, trunk and extremities  3. Multiple vascular papules on trunk, arms and legs  4. Multiple scattered keratotic plaques  5. Inflamed seborrheic keratosis on right forearm x 1, left upper back x 2, left popliteal fossa x 1, and right lateral breast x 2.  6. pink scaly papules located on right upper forehead x 1, and right nasal bridge x 1    Except as noted above, no other signs of skin cancer or melanoma.     ASSESSMENT/PLAN:   Benign Full skin cancer screening today. . Patient with history of none  Advised on monthly self exams and 1 year  Patient Education: Appropriate brochures given.    Multiple benign appearing nevi on arms, legs and trunk. Discussed ABCDEs of melanoma and sunscreen.   Multiple lentigos on arms, legs and trunk. Advised benign, no treatment needed.  Multiple scattered angiomas. Advised benign, no treatment needed.   Seborrheic keratosis on arms, legs and trunk. Advised benign, no treatment needed.  Inflamed seborrheic keratosis on right forearm x 1, left upper back x 2, left popliteal fossa x 1, and right lateral breast x 2. As physically tender cryosurgery performed. Advised on post op care.    Actinic keratosis on right upper forehead x 1 and right nasal bridge x 1. As precancerous, cryosurgery performed. Advised on blistering and post-op care. Advised if not resolved in 1-2 months to return for evaluation.  Patient to follow up with Primary Care provider regarding elevated blood pressure.        Follow-up: Yearly FSE/PRN sooner    1.) Patient was asked about new and changing moles. YES  2.) Patient received a complete physical skin examination: YES  3.) Patient was counseled to perform a monthly self skin examination: YES  Scribed By:  Enid Jean, Medical Scribe    The information in this document, created by the medical scribe for me, accurately reflects the services I personally performed and the decisions made by me. I have reviewed and approved this document for accuracy prior to leaving the patient care area.  Simona Vega PA-C October 25, 2018 4:13 PM   Simona Vega MS, PARovertoC

## 2018-10-25 NOTE — LETTER
10/25/2018         RE: Abiola Rios  [address] [address]        Dear Colleague,    Thank you for referring your patient, Abiola Rios, to the [facility]. Please see a copy of my visit note below.    HPI:   Abiola Rios is a 67 year old female who presents for Full skin cancer screening.  chief complaint  Last Skin Exam: n/a       1st Baseline: YES  Personal HX of Skin Cancer: no   Personal HX of Malignant Melanoma: no   Family HX of Skin Cancer / Malignant Melanoma: no  Personal HX of Atypical Moles:   no  Risk factors: limitied sun exposure - burns easily/history of blistering burns, previous tanning bed exposure  New / Changing lesions:Yes - few on face and right arm,   Social History: lives in [place].  Cares for 3 parents, mother has dementia is still living in home.  On review of systems, there are no further skin complaints, patient is feeling otherwise well.  See patient intake sheet.  ROS of the following were done and are negative: Constitutional, Eyes, Ears, Nose,   Mouth, Throat, Cardiovascular, Respiratory, GI, Genitourinary, Musculoskeletal,   Psychiatric, Endocrine, Allergic/Immunologic.    This document serves as a record of the services and decisions personally performed and made by Simona Vega PA-C. It was created on her behalf by Enid Jean, a trained medical scribe. The creation of this document is based the provider's statements to the medical scribe.  Enid Jean October 25, 2018 3:54 PM     PHYSICAL EXAM:   /85  Pulse 58  SpO2 99%  Skin exam performed as follows: Type 2 skin. Mood appropriate  Alert and Oriented X 3. Well developed, well nourished in no distress.  General appearance: Normal  Head including face: Normal  Eyes: conjunctiva and lids: Normal  Mouth: Lips, teeth, gums: Normal  Neck: Normal  Chest-breast/axillae: Normal  Back: Normal  Spleen and liver: Normal  Cardiovascular: Exam of peripheral vascular system by  observation for swelling, varicosities, edema: Normal  Genitalia: groin, buttocks: Normal  Extremities: digits/nails (clubbing): Normal  Eccrine and Apocrine glands: Normal  Right upper extremity: Normal  Left upper extremity: Normal  Right lower extremity: Normal  Left lower extremity: Normal  Skin: Scalp and body hair: See below    Pt deferred exam of breasts, groin, buttocks: No    Other physical findings:  1. Multiple pigmented macules on extremities and trunk  2. Multiple pigmented macules on face, trunk and extremities  3. Multiple vascular papules on trunk, arms and legs  4. Multiple scattered keratotic plaques  5. Inflamed seborrheic keratosis on right forearm x 1, left upper back x 2, left popliteal fossa x 1, and right lateral breast x 2.  6. pink scaly papules located on right upper forehead x 1, and right nasal bridge x 1    Except as noted above, no other signs of skin cancer or melanoma.     ASSESSMENT/PLAN:   Benign Full skin cancer screening today. . Patient with history of none  Advised on monthly self exams and 1 year  Patient Education: Appropriate brochures given.    Multiple benign appearing nevi on arms, legs and trunk. Discussed ABCDEs of melanoma and sunscreen.   Multiple lentigos on arms, legs and trunk. Advised benign, no treatment needed.  Multiple scattered angiomas. Advised benign, no treatment needed.   Seborrheic keratosis on arms, legs and trunk. Advised benign, no treatment needed.  Inflamed seborrheic keratosis on right forearm x 1, left upper back x 2, left popliteal fossa x 1, and right lateral breast x 2. As physically tender cryosurgery performed. Advised on post op care.    Actinic keratosis on right upper forehead x 1 and right nasal bridge x 1. As precancerous, cryosurgery performed. Advised on blistering and post-op care. Advised if not resolved in 1-2 months to return for evaluation.  Patient to follow up with Primary Care provider regarding elevated blood  pressure.        Follow-up: Yearly FSE/PRN sooner    1.) Patient was asked about new and changing moles. YES  2.) Patient received a complete physical skin examination: YES  3.) Patient was counseled to perform a monthly self skin examination: YES  Scribed By: Enid Jean Medical Scribe    The information in this document, created by the medical scribe for me, accurately reflects the services I personally performed and the decisions made by me. I have reviewed and approved this document for accuracy prior to leaving the patient care area.  Simona Vega PA-C October 25, 2018 4:13 PM   Simona Vega, MS, ALICIA     Again, thank you for allowing me to participate in the care of your patient.        Sincerely,        Simona Vega PA-C

## 2018-10-25 NOTE — NURSING NOTE
Chief Complaint   Patient presents with     Skin Check       Vitals:    10/25/18 1549   BP: 154/85   Pulse: 58   SpO2: 99%     Wt Readings from Last 1 Encounters:   06/28/17 59.4 kg (131 lb)       Nova Zhu LPN.................10/25/2018

## 2019-03-04 ENCOUNTER — HOSPITAL ENCOUNTER (EMERGENCY)
Facility: CLINIC | Age: 69
Discharge: HOME | End: 2019-03-04
Attending: NURSE PRACTITIONER | Admitting: NURSE PRACTITIONER
Payer: MEDICARE

## 2019-03-04 VITALS
RESPIRATION RATE: 16 BRPM | WEIGHT: 135 LBS | OXYGEN SATURATION: 97 % | BODY MASS INDEX: 20.53 KG/M2 | DIASTOLIC BLOOD PRESSURE: 98 MMHG | SYSTOLIC BLOOD PRESSURE: 152 MMHG | TEMPERATURE: 97.9 F

## 2019-03-04 DIAGNOSIS — N30.00 ACUTE CYSTITIS WITHOUT HEMATURIA: ICD-10-CM

## 2019-03-04 LAB
ALBUMIN UR-MCNC: NEGATIVE MG/DL
APPEARANCE UR: CLEAR
BILIRUB UR QL STRIP: NEGATIVE
COLOR UR AUTO: ABNORMAL
GLUCOSE UR STRIP-MCNC: NEGATIVE MG/DL
HGB UR QL STRIP: ABNORMAL
KETONES UR STRIP-MCNC: NEGATIVE MG/DL
LEUKOCYTE ESTERASE UR QL STRIP: ABNORMAL
NITRATE UR QL: NEGATIVE
PH UR STRIP: 7 PH (ref 5–7)
RBC #/AREA URNS AUTO: <1 /HPF (ref 0–2)
SOURCE: ABNORMAL
SP GR UR STRIP: 1 (ref 1–1.03)
UROBILINOGEN UR STRIP-MCNC: 0 MG/DL (ref 0–2)
WBC #/AREA URNS AUTO: 4 /HPF (ref 0–5)

## 2019-03-04 PROCEDURE — G0463 HOSPITAL OUTPT CLINIC VISIT: HCPCS | Performed by: NURSE PRACTITIONER

## 2019-03-04 PROCEDURE — 81001 URINALYSIS AUTO W/SCOPE: CPT | Performed by: NURSE PRACTITIONER

## 2019-03-04 PROCEDURE — 99214 OFFICE O/P EST MOD 30 MIN: CPT | Mod: Z6 | Performed by: NURSE PRACTITIONER

## 2019-03-04 PROCEDURE — 87086 URINE CULTURE/COLONY COUNT: CPT | Performed by: NURSE PRACTITIONER

## 2019-03-04 RX ORDER — CEPHALEXIN 500 MG/1
500 CAPSULE ORAL 2 TIMES DAILY
Qty: 14 CAPSULE | Refills: 0 | Status: SHIPPED | OUTPATIENT
Start: 2019-03-04 | End: 2019-03-11

## 2019-03-04 ASSESSMENT — ENCOUNTER SYMPTOMS
FATIGUE: 0
DYSURIA: 1
HEMATURIA: 0
NAUSEA: 0
COUGH: 0
VOMITING: 0
FEVER: 0
HEADACHES: 0
FLANK PAIN: 0
FREQUENCY: 1

## 2019-03-04 NOTE — ED AVS SNAPSHOT
[facility] Emergency Department  [address] [address]  Phone:  [phone]  Fax:  [phone]                                    Abiola Rios   MRN: [id number]    Department:  [facility] Emergency Department   Date of Visit:  3/4/2019           After Visit Summary Signature Page    I have received my discharge instructions, and my questions have been answered. I have discussed any challenges I see with this plan with the nurse or doctor.    ..........................................................................................................................................  Patient/Patient Representative Signature      ..........................................................................................................................................  Patient Representative Print Name and Relationship to Patient    ..................................................               ................................................  Date                                   Time    ..........................................................................................................................................  Reviewed by Signature/Title    ...................................................              ..............................................  Date                                               Time          22EPIC Rev 08/18

## 2019-03-04 NOTE — ED PROVIDER NOTES
History     Chief Complaint   Patient presents with     UTI     UTI symptoms     HPI  Abiola Rios is a 68 year old female who presents to urgent care for evaluation of dysuria, urinary urgency and frequency. Symptoms started 7 days ago. Denies fever. Denies flank pain. Intermittent suprapubic pain and increased pain/pressure when sitting.      Allergies:  Allergies   Allergen Reactions     Nka [No Known Allergies]        Problem List:    Patient Active Problem List    Diagnosis Date Noted     Anemia 06/30/2017     Priority: Medium     Muscle pain 06/28/2017     Priority: Medium     S/P total hip arthroplasty, left 06/28/2017     Priority: Medium     Cerebral aneurysm, nonruptured 01/05/2016     Priority: Medium     5 vessels, just monitoring for now       Systemic lupus erythematosus (H) 01/05/2016     Priority: Medium     Hyperlipidemia LDL goal <100 01/05/2016     Priority: Medium     AVM (arteriovenous malformation) brain 01/05/2016     Priority: Medium     Glaucoma 01/05/2016     Priority: Medium     Non-toxic multinodular goiter 11/23/2007     Priority: Medium     Overview:   negative FNA 11/07  Dr. Nelson recommends yearly ultrasound and TSH       Gastroesophageal reflux disease 09/20/2007     Priority: Medium     Irritable bowel syndrome 09/20/2007     Priority: Medium     History of colonic polyps 09/20/2007     Priority: Medium     Overview:   adenomatous          Past Medical History:    Past Medical History:   Diagnosis Date     AVM (arteriovenous malformation) brain      Cerebral aneurysm without rupture      Glaucoma      Hyperlipidemia      Lupus        Past Surgical History:    Past Surgical History:   Procedure Laterality Date     ARTHROPLASTY HIP Left 6/28/2017    Procedure: ARTHROPLASTY HIP;  Left total hip arthroplasty choice anes;  Surgeon: Tyrell Pimentel MD;  Location: [facility] OR     ARTHROSCOPY SHOULDER, OPEN ROTATOR CUFF REPAIR, COMBINED Left 4/14/2016    Procedure: COMBINED ARTHROSCOPY  SHOULDER, OPEN ROTATOR CUFF REPAIR;  Surgeon: Tyrell Pimentel MD;  Location: [facility] OR     COLONOSCOPY N/A 2/12/2015    Procedure: COLONOSCOPY;  Surgeon: Bipin Pride MD;  Location: [facility] GI     EXAM UNDER ANESTHESIA, MANIPULATE JOINT (LOCATION) Left 6/29/2016    Procedure: EXAM UNDER ANESTHESIA, MANIPULATE JOINT (LOCATION);  Surgeon: Tyrell Pimentel MD;  Location: [facility] OR     ORTHOPEDIC SURGERY         Family History:    Family History   Problem Relation Age of Onset     Dementia Mother      Heart Disease Maternal Grandmother      Heart Disease Maternal Grandfather      Respiratory Sister        Social History:  Marital Status:   [2]  Social History     Tobacco Use     Smoking status: Former Smoker     Packs/day: 0.25     Start date: 1/1/1983     Smokeless tobacco: Never Used   Substance Use Topics     Alcohol use: Yes     Comment: occational     Drug use: No        Medications:      cephALEXin (KEFLEX) 500 MG capsule   atorvastatin (LIPITOR) 10 MG tablet   Cholecalciferol (VITAMIN D3 PO)   Cyanocobalamin (VITAMIN B 12 PO)   latanoprost (XALATAN) 0.005 % ophthalmic solution   order for DME         Review of Systems   Constitutional: Negative for fatigue and fever.   HENT: Negative for congestion.    Respiratory: Negative for cough.    Cardiovascular: Negative for chest pain.   Gastrointestinal: Negative for nausea and vomiting.   Genitourinary: Positive for dysuria, frequency, pelvic pain and urgency. Negative for flank pain and hematuria.   Skin: Negative for rash.   Neurological: Negative for headaches.       Physical Exam   BP: (!) 152/98  Heart Rate: 79  Temp: 97.9  F (36.6  C)  Resp: 16  Weight: 61.2 kg (135 lb)  SpO2: 97 %      Physical Exam    GENERAL APPEARANCE: healthy, alert and no distress  RESP: lungs clear to auscultation - no rales, rhonchi or wheezes  CV: regular rates and rhythm, normal S1 S2, no murmur noted    ED Course        Procedures             Results for orders placed or performed  during the hospital encounter of 03/04/19 (from the past 24 hour(s))   UA reflex to Microscopic   Result Value Ref Range    Color Urine Straw     Appearance Urine Clear     Glucose Urine Negative NEG^Negative mg/dL    Bilirubin Urine Negative NEG^Negative    Ketones Urine Negative NEG^Negative mg/dL    Specific Gravity Urine 1.001 (L) 1.003 - 1.035    Blood Urine Small (A) NEG^Negative    pH Urine 7.0 5.0 - 7.0 pH    Protein Albumin Urine Negative NEG^Negative mg/dL    Urobilinogen mg/dL 0.0 0.0 - 2.0 mg/dL    Nitrite Urine Negative NEG^Negative    Leukocyte Esterase Urine Moderate (A) NEG^Negative    Source Midstream Urine     RBC Urine <1 0 - 2 /HPF    WBC Urine 4 0 - 5 /HPF       Medications - No data to display    Assessments & Plan (with Medical Decision Making)   Urinalysis positive for infection.  No signs or symptoms concerning for pyelonephritis or nephrolithiasis at this time.  Patient will be discharged home stable on Keflex pending urine culture results from today's visit.  She was instructed to follow up with PCP if no improvement in three days.  Worrisome reasons to seek care sooner discussed.      I have reviewed the nursing notes.    I have reviewed the findings, diagnosis, plan and need for follow up with the patient.         Medication List      Started    cephALEXin 500 MG capsule  Commonly known as:  KEFLEX  500 mg, Oral, 2 TIMES DAILY            Final diagnoses:   Acute cystitis without hematuria       3/4/2019   [facility] EMERGENCY DEPARTMENT     Susy Sahu APRN CNP  03/04/19 6785

## 2019-03-04 NOTE — RESULT ENCOUNTER NOTE
Emergency Dept/Urgent Care discharge antibiotic (if prescribed): Cephalexin (Keflex) 500 mg capsule, 1 capsule (500 mg) by mouth 2 times daily for 7 days.  Date of Rx (if applicable):  3/4/19  No changes in treatment per Urine culture protocol.

## 2019-03-05 LAB
BACTERIA SPEC CULT: NO GROWTH
Lab: NORMAL
SPECIMEN SOURCE: NORMAL

## 2019-03-05 NOTE — RESULT ENCOUNTER NOTE
Abiola notified.  Advised to discontinue antibiotic as per protocol.  Follow up with PCP as soon as possible if persisting symptoms.

## 2019-03-05 NOTE — RESULT ENCOUNTER NOTE
Await final culture report per [place] ED Lab Result protocol.  RN confirmed Patient was prescribed antibiotic from ED visit.

## 2019-03-05 NOTE — RESULT ENCOUNTER NOTE
"Final urine culture report shows \"NO GROWTH\" and is NEGATIVE.  Emergency Dept discharge antibiotic: Cephalexin (Keflex) 500 mg capsule, 1 capsule (500 mg) by mouth 2 times daily for 7 days.  Is ED discharge Rx antibiotic for UTI only (Yes/No): Yes  Recommendations per [place] ED Lab result protocol - Urine culture protocol."

## 2019-11-02 ENCOUNTER — HEALTH MAINTENANCE LETTER (OUTPATIENT)
Age: 69
End: 2019-11-02

## 2020-02-10 ENCOUNTER — HEALTH MAINTENANCE LETTER (OUTPATIENT)
Age: 70
End: 2020-02-10

## 2021-04-03 ENCOUNTER — HEALTH MAINTENANCE LETTER (OUTPATIENT)
Age: 71
End: 2021-04-03

## 2021-05-31 ENCOUNTER — RECORDS - HEALTHEAST (OUTPATIENT)
Dept: ADMINISTRATIVE | Facility: CLINIC | Age: 71
End: 2021-05-31

## 2021-08-09 ENCOUNTER — APPOINTMENT (OUTPATIENT)
Dept: GENERAL RADIOLOGY | Facility: CLINIC | Age: 71
End: 2021-08-09
Attending: PHYSICIAN ASSISTANT
Payer: COMMERCIAL

## 2021-08-09 ENCOUNTER — HOSPITAL ENCOUNTER (EMERGENCY)
Facility: CLINIC | Age: 71
Discharge: HOME | End: 2021-08-09
Attending: PHYSICIAN ASSISTANT | Admitting: PHYSICIAN ASSISTANT
Payer: COMMERCIAL

## 2021-08-09 VITALS
WEIGHT: 135 LBS | HEIGHT: 69 IN | DIASTOLIC BLOOD PRESSURE: 76 MMHG | SYSTOLIC BLOOD PRESSURE: 138 MMHG | HEART RATE: 63 BPM | BODY MASS INDEX: 19.99 KG/M2 | RESPIRATION RATE: 16 BRPM | OXYGEN SATURATION: 96 % | TEMPERATURE: 97.8 F

## 2021-08-09 DIAGNOSIS — S99.922A FOOT INJURY, LEFT, INITIAL ENCOUNTER: ICD-10-CM

## 2021-08-09 DIAGNOSIS — M79.672 LEFT FOOT PAIN: ICD-10-CM

## 2021-08-09 PROCEDURE — 73630 X-RAY EXAM OF FOOT: CPT | Mod: LT

## 2021-08-09 PROCEDURE — 99213 OFFICE O/P EST LOW 20 MIN: CPT | Performed by: PHYSICIAN ASSISTANT

## 2021-08-09 PROCEDURE — G0463 HOSPITAL OUTPT CLINIC VISIT: HCPCS | Performed by: PHYSICIAN ASSISTANT

## 2021-08-09 ASSESSMENT — MIFFLIN-ST. JEOR: SCORE: 1191.74

## 2021-08-10 ASSESSMENT — ENCOUNTER SYMPTOMS: CONSTITUTIONAL NEGATIVE: 1
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
## 2021-08-12 ENCOUNTER — OFFICE VISIT (OUTPATIENT)
Dept: PODIATRY | Facility: CLINIC | Age: 71
End: 2021-08-12
Payer: COMMERCIAL

## 2021-08-12 VITALS
HEIGHT: 69 IN | DIASTOLIC BLOOD PRESSURE: 82 MMHG | SYSTOLIC BLOOD PRESSURE: 146 MMHG | WEIGHT: 135 LBS | BODY MASS INDEX: 19.99 KG/M2

## 2021-08-12 DIAGNOSIS — S99.922A FOOT INJURY, LEFT, INITIAL ENCOUNTER: Primary | ICD-10-CM

## 2021-08-12 DIAGNOSIS — M79.672 LEFT FOOT PAIN: ICD-10-CM

## 2021-08-12 PROCEDURE — 99203 OFFICE O/P NEW LOW 30 MIN: CPT | Performed by: PODIATRIST

## 2021-08-12 ASSESSMENT — MIFFLIN-ST. JEOR: SCORE: 1191.74
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
## 2021-08-12 NOTE — PATIENT INSTRUCTIONS
Contrast Soaking    1.  Fill one basin with warm water with Epson's salt and a second basin with ice water  2.  Soak the foot first in ice water for 5 minutes.  3   Place the foot in the warm water for 5 minutes.  4.  Repeat cold, hot, and finish with a cold soak.    Perform this morning and night.    Next Steps:      1. Support:  a. Wear supportive shoes, sandals, boots and/or inserts that have a rigid supportive sole.    i. This will offload the majority of tension forces that travel through your feet every step you take.    1. [employer] Max Cushioning Elite/Premier   2. [employer] Relax Fit D'Lux Walker  3. Superfeet inserts (www.superfeet.com)  b. It is important that you also wear supportive shoe wear in the house to continue providing support to your feet.    c. You may always use a cushioned liner for your shoes if that makes your feet feel better.  2. Stretching  a. Calf stretching is essential to offload the tension forces that travel through your feet every step you take  b. Preferred calf stretch is the Runner's Stretch  i. Place one foot behind the other foot, flat against the ground (it is important to keep the heel on the ground).  The back leg is the one that will be stretched.  1. Start with the knee straight and lean your hips into the wall, counter or whatever you are leaning into - count to ten.  2. Next, bend the knee.  You should feel the stretch lower in the calf muscle - count to ten.  c. Repeat this stretch once an hour to start off with.  When symptoms subside, I recommend performing the stretch 3 times daily to prevent any future problems.                3. Tissue Massage  a. It is important that you physically loosen the inflammation tissue to help your body heal the injured tissue.  b. I recommend soaking your foot in warm water to increase the microcirculation to the soft tissues.  You may add Epson salt to the water if you prefer.  c. You may apply an over-the-counter muscle rub, such as  Voltaren gel, and deeply massage the injured tissue.  4. Reduce Inflammation  a. You can ice the injured tissue with an ice pack with a light cloth covering or soaking in ice water 20 minutes to reduce any acute inflammation, typically at the end of the day.  b. If tolerated, you may take a Non-Steroidal Antiinflammatory medication (NSAID), such as Advil or Aleve, to help reduce the inflammation tissue.  This can help the overall healing of the injured tissue.  i. It is important to take food with any NSAID medication as the most common side effect is stomach irritation.  If you encounter any problems when taking NSAID, it is recommended that you stop taking the medication and notify your provider.    It is important to understand that most problems that develop in the foot and ankle are caused by excessive tension that cause microinjury to the soft tissues and inflammation in the foot and ankle.  By addressing the underlying causes with support and stretching as well as treating the current inflammatory conditions with tissue massage and anti-inflammatory treatments, most foot and ankle musculoskeletal conditions will resolve.  This may take time to heal.  However, if symptoms persist past 4 weeks you should return to the office for reevaluation to determine further treatment options.
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
## 2021-09-12 ENCOUNTER — HEALTH MAINTENANCE LETTER (OUTPATIENT)
Age: 71
End: 2021-09-12

## 2021-11-07 ENCOUNTER — HEALTH MAINTENANCE LETTER (OUTPATIENT)
Age: 71
End: 2021-11-07

## 2022-04-24 ENCOUNTER — HEALTH MAINTENANCE LETTER (OUTPATIENT)
Age: 72
End: 2022-04-24

## 2022-08-15 ENCOUNTER — OFFICE VISIT (OUTPATIENT)
Dept: DERMATOLOGY | Facility: CLINIC | Age: 72
End: 2022-08-15
Payer: COMMERCIAL

## 2022-08-15 DIAGNOSIS — L82.0 INFLAMED SEBORRHEIC KERATOSIS: ICD-10-CM

## 2022-08-15 DIAGNOSIS — L57.0 ACTINIC KERATOSIS: ICD-10-CM

## 2022-08-15 DIAGNOSIS — L82.1 SEBORRHEIC KERATOSIS: ICD-10-CM

## 2022-08-15 DIAGNOSIS — D22.9 NEVUS: Primary | ICD-10-CM

## 2022-08-15 DIAGNOSIS — D18.01 ANGIOMA OF SKIN: ICD-10-CM

## 2022-08-15 DIAGNOSIS — L81.4 LENTIGO: ICD-10-CM

## 2022-08-15 PROCEDURE — 17003 DESTRUCT PREMALG LES 2-14: CPT | Mod: 59 | Performed by: PHYSICIAN ASSISTANT

## 2022-08-15 PROCEDURE — 17000 DESTRUCT PREMALG LESION: CPT | Mod: 59 | Performed by: PHYSICIAN ASSISTANT

## 2022-08-15 PROCEDURE — 17110 DESTRUCTION B9 LES UP TO 14: CPT | Performed by: PHYSICIAN ASSISTANT

## 2022-08-15 PROCEDURE — 99203 OFFICE O/P NEW LOW 30 MIN: CPT | Mod: 25 | Performed by: PHYSICIAN ASSISTANT

## 2022-08-15 ASSESSMENT — PAIN SCALES - GENERAL: PAINLEVEL: NO PAIN (0)

## 2022-08-15 NOTE — LETTER
8/15/2022         RE: Abiola Rios  [address] [facility] [address]        Dear Colleague,    Thank you for referring your patient, Abiola Rios, to the [facility]. Please see a copy of my visit note below.    HPI:   Abiola Rios is a 71 year old female who presents for Full skin cancer screening.  Last Skin Exam: 4 years ago       1st Baseline: no  Personal HX of Skin Cancer: no   Personal HX of Malignant Melanoma: no   Family HX of Skin Cancer / Malignant Melanoma: no  Personal HX of Atypical Moles:   no  Risk factors: limitied sun exposure - burns easily/history of blistering burns, previous tanning bed exposure  New / Changing lesions: itchy spot on the forehead and on the back  Social History: lives in [place].   On review of systems, there are no further skin complaints, patient is feeling otherwise well.  See patient intake sheet.  ROS of the following were done and are negative: Constitutional, Eyes, Ears, Nose,   Mouth, Throat, Cardiovascular, Respiratory, GI, Genitourinary, Musculoskeletal,   Psychiatric, Endocrine, Allergic/Immunologic.    PHYSICAL EXAM:   There were no vitals taken for this visit.  Skin exam performed as follows: Type 2 skin. Mood appropriate  Alert and Oriented X 3. Well developed, well nourished in no distress.  General appearance: Normal  Head including face: Normal  Eyes: conjunctiva and lids: Normal  Mouth: Lips, teeth, gums: Normal  Neck: Normal  Chest-breast/axillae: Normal  Back: Normal  Spleen and liver: Normal  Cardiovascular: Exam of peripheral vascular system by observation for swelling, varicosities, edema: Normal  Genitalia: groin, buttocks: Normal  Extremities: digits/nails (clubbing): Normal  Eccrine and Apocrine glands: Normal  Right upper extremity: Normal  Left upper extremity: Normal  Right lower extremity: Normal  Left lower extremity: Normal  Skin: Scalp and body hair: See below    Pt deferred exam of breasts, groin,  buttocks: No    Other physical findings:  1. Multiple pigmented macules on extremities and trunk  2. Multiple pigmented macules on face, trunk and extremities  3. Multiple vascular papules on trunk, arms and legs  4. Multiple scattered keratotic plaques  5. Pink gritty papules on the right upper lip x 2  6. Inflamed keratotic papules on the back x 6, left forehead x 1, left cheek x 1      Except as noted above, no other signs of skin cancer or melanoma.     ASSESSMENT/PLAN:   Benign Full skin cancer screening today. . Patient with history of none  Advised on monthly self exams and 1 year  Patient Education: Appropriate brochures given.    Multiple benign appearing nevi on arms, legs and trunk. Discussed ABCDEs of melanoma and sunscreen.   Multiple lentigos on arms, legs and trunk. Advised benign, no treatment needed.  Multiple scattered angiomas. Advised benign, no treatment needed.   Seborrheic keratosis on arms, legs and trunk. Advised benign, no treatment needed.  Actinic keratosis on the right upper lip x 2. As precancerous, cryosurgery performed. Advised on blistering and post-op care. Advised if not resolved in 1-2 months to return for evaluation  Inflamed seborrheic keratosis on the back x 6, left forehead x 1, left cheek x 1. As physically tender cryosurgery performed. Advised on post op care.             Follow-up: Yearly FSE/PRN sooner    1.) Patient was asked about new and changing moles. YES  2.) Patient received a complete physical skin examination: YES  3.) Patient was counseled to perform a monthly self skin examination: YES  Scribed By: Siomna Vega, MS, PARovertoC       Again, thank you for allowing me to participate in the care of your patient.        Sincerely,        Simona Vega PA-C

## 2022-08-15 NOTE — PROGRESS NOTES
HPI:   Abiola Rios is a 71 year old female who presents for Full skin cancer screening.  Last Skin Exam: 4 years ago       1st Baseline: no  Personal HX of Skin Cancer: no   Personal HX of Malignant Melanoma: no   Family HX of Skin Cancer / Malignant Melanoma: no  Personal HX of Atypical Moles:   no  Risk factors: limitied sun exposure - burns easily/history of blistering burns, previous tanning bed exposure  New / Changing lesions: itchy spot on the forehead and on the back  Social History: lives in [place].   On review of systems, there are no further skin complaints, patient is feeling otherwise well.  See patient intake sheet.  ROS of the following were done and are negative: Constitutional, Eyes, Ears, Nose,   Mouth, Throat, Cardiovascular, Respiratory, GI, Genitourinary, Musculoskeletal,   Psychiatric, Endocrine, Allergic/Immunologic.    PHYSICAL EXAM:   There were no vitals taken for this visit.  Skin exam performed as follows: Type 2 skin. Mood appropriate  Alert and Oriented X 3. Well developed, well nourished in no distress.  General appearance: Normal  Head including face: Normal  Eyes: conjunctiva and lids: Normal  Mouth: Lips, teeth, gums: Normal  Neck: Normal  Chest-breast/axillae: Normal  Back: Normal  Spleen and liver: Normal  Cardiovascular: Exam of peripheral vascular system by observation for swelling, varicosities, edema: Normal  Genitalia: groin, buttocks: Normal  Extremities: digits/nails (clubbing): Normal  Eccrine and Apocrine glands: Normal  Right upper extremity: Normal  Left upper extremity: Normal  Right lower extremity: Normal  Left lower extremity: Normal  Skin: Scalp and body hair: See below    Pt deferred exam of breasts, groin, buttocks: No    Other physical findings:  1. Multiple pigmented macules on extremities and trunk  2. Multiple pigmented macules on face, trunk and extremities  3. Multiple vascular papules on trunk, arms and legs  4. Multiple scattered keratotic  plaques  5. Pink gritty papules on the right upper lip x 2  6. Inflamed keratotic papules on the back x 6, left forehead x 1, left cheek x 1      Except as noted above, no other signs of skin cancer or melanoma.     ASSESSMENT/PLAN:   Benign Full skin cancer screening today. . Patient with history of none  Advised on monthly self exams and 1 year  Patient Education: Appropriate brochures given.    Multiple benign appearing nevi on arms, legs and trunk. Discussed ABCDEs of melanoma and sunscreen.   Multiple lentigos on arms, legs and trunk. Advised benign, no treatment needed.  Multiple scattered angiomas. Advised benign, no treatment needed.   Seborrheic keratosis on arms, legs and trunk. Advised benign, no treatment needed.  Actinic keratosis on the right upper lip x 2. As precancerous, cryosurgery performed. Advised on blistering and post-op care. Advised if not resolved in 1-2 months to return for evaluation  Inflamed seborrheic keratosis on the back x 6, left forehead x 1, left cheek x 1. As physically tender cryosurgery performed. Advised on post op care.             Follow-up: Yearly FSE/PRN sooner    1.) Patient was asked about new and changing moles. YES  2.) Patient received a complete physical skin examination: YES  3.) Patient was counseled to perform a monthly self skin examination: YES  Scribed By: Simona Vega MS, PARovertoC

## 2022-09-30 ENCOUNTER — LAB (OUTPATIENT)
Dept: FAMILY MEDICINE | Facility: CLINIC | Age: 72
End: 2022-09-30
Attending: FAMILY MEDICINE
Payer: COMMERCIAL

## 2022-09-30 DIAGNOSIS — Z20.822 SUSPECTED 2019 NOVEL CORONAVIRUS INFECTION: ICD-10-CM

## 2022-09-30 LAB — SARS-COV-2 RNA RESP QL NAA+PROBE: POSITIVE

## 2022-09-30 PROCEDURE — U0003 INFECTIOUS AGENT DETECTION BY NUCLEIC ACID (DNA OR RNA); SEVERE ACUTE RESPIRATORY SYNDROME CORONAVIRUS 2 (SARS-COV-2) (CORONAVIRUS DISEASE [COVID-19]), AMPLIFIED PROBE TECHNIQUE, MAKING USE OF HIGH THROUGHPUT TECHNOLOGIES AS DESCRIBED BY CMS-2020-01-R: HCPCS

## 2022-09-30 PROCEDURE — 99207 PR NO CHARGE LOS: CPT

## 2022-09-30 PROCEDURE — U0005 INFEC AGEN DETEC AMPLI PROBE: HCPCS

## 2022-09-30 NOTE — PROGRESS NOTES
COVID-19 PCR test completed. Patient handout For Patients Who Have Been Tested for Covid-19 (Coronavirus) was given to the patient, which includes test result notification process.     Ears: no ear pain and no hearing problems.Nose: no nasal congestion and no nasal drainage.Mouth/Throat: no dysphagia, no hoarseness and no throat pain.Neck: no lumps, no pain, no stiffness and no swollen glands.

## 2022-10-01 ENCOUNTER — TELEPHONE (OUTPATIENT)
Dept: NURSING | Facility: CLINIC | Age: 72
End: 2022-10-01

## 2022-10-03 ENCOUNTER — VIRTUAL VISIT (OUTPATIENT)
Dept: FAMILY MEDICINE | Facility: CLINIC | Age: 72
End: 2022-10-03
Payer: COMMERCIAL

## 2022-10-03 DIAGNOSIS — U07.1 INFECTION DUE TO 2019 NOVEL CORONAVIRUS: Primary | ICD-10-CM

## 2022-10-03 PROCEDURE — 99202 OFFICE O/P NEW SF 15 MIN: CPT | Mod: CS | Performed by: FAMILY MEDICINE

## 2022-10-03 NOTE — PROGRESS NOTES
Abiola is a 71 year old who is being evaluated via a billable telephone visit.      What phone number would you like to be contacted at? cell  How would you like to obtain your AVS? MyChart    Assessment & Plan     Infection due to 2019 novel coronavirus  Day 6 , feeling much better  call if worse. will get booster in 2-3 months               Follow up in 2 months.   See Patient Instructions    No follow-ups on file.    Fady Davis MD  [facility]    Subjective   Abiola is a 71 year old, presenting for the following health issues:  No chief complaint on file.      HPI     Acute Illness  Acute illness concerns: covid  Onset/Duration: 6 days  Symptoms:  Fever: No  Chills/Sweats: No  Headache (location?): No  Sinus Pressure: No  Conjunctivitis:  No  Ear Pain: no  Rhinorrhea: YES  Congestion: YES  Sore Throat: YES  Cough: YES-non-productive  Wheeze: No  Decreased Appetite: YES  Nausea: No     Sick/Strep Exposure: No  Therapies tried and outcome: None      Review of Systems   Constitutional, HEENT, cardiovascular, pulmonary, gi and gu systems are negative, except as otherwise noted.      Objective           Vitals:  No vitals were obtained today due to virtual visit.    Physical Exam   healthy, alert and no distress  PSYCH: Alert and oriented times 3; coherent speech, normal   rate and volume, able to articulate logical thoughts, able   to abstract reason, no tangential thoughts, no hallucinations   or delusions  Her affect is normal  RESP: No cough, no audible wheezing, able to talk in full sentences  Remainder of exam unable to be completed due to telephone visits    Lab on 09/30/2022   Component Date Value Ref Range Status     SARS CoV2 PCR 09/30/2022 Positive (A) Negative Final    POSITIVE: SARS-CoV-2 (COVID-19) RNA detected, presumed positive.               Phone call duration: 12 minutes

## 2022-11-19 ENCOUNTER — HEALTH MAINTENANCE LETTER (OUTPATIENT)
Age: 72
End: 2022-11-19

## 2023-04-09 ENCOUNTER — HEALTH MAINTENANCE LETTER (OUTPATIENT)
Age: 73
End: 2023-04-09

## 2023-11-18 NOTE — TELEPHONE ENCOUNTER
Coronavirus (COVID-19) Notification    Caller Name (Patient, parent, daughter/son, grandparent, etc)  Patient    Reason for call  Notify of Positive Coronavirus (COVID-19) lab results, assess symptoms,  review [facility] recommendations    Lab Result    Lab test:  2019-nCoV rRt-PCR or SARS-CoV-2 PCR    Oropharyngeal AND/OR nasopharyngeal swabs is POSITIVE for 2019-nCoV RNA/SARS-COV-2 PCR (COVID-19 virus)      Gather patient reported symptoms   Assessment   Current Symptoms at time of phone call, reported by patient: (if no symptoms, document: No symptoms] Headache, cough   Date of symptom(s) onset (if applicable) 9/28/22     If at time of call, Patients symptoms have worsened, the Patient should contact 911 or have someone drive them to Emergency Dept promptly:      If Patient calling 911, inform 911 personal that you have tested positive for the Coronavirus (COVID-19).  Place mask on and await 911 to arrive.    If Emergency Dept, If possible, please have another adult drive you to the Emergency Dept but you need to wear mask when in contact with other people.      Treatment Options:   Patient classified as COVID treatment eligible by Epic high risk algorithm: Yes  Is the patient symptomatic at the time of result notification? Yes. Was the onset of symptoms within the last 5 days? Yes.   There are now oral medications available for the treatment of COVID-19.  Taking one of these medications within the first five days of symptoms (when people may not yet feel severely ill) has been shown to make people feel better, prevent them from getting sicker, and preventing hospitalization and death.   Does the patient agree to have a visit with a provider to discuss treatment options? Yes. Is the patient seen at [facility]?  No: Warm transfer caller to [phone] to be scheduled with a virtual urgent provider.  During transfer, instruct  on appropriate time frame for visit     Review information  with Patient    Your result was positive. This means you have COVID-19 (coronavirus).    How can I protect others?    These guidelines are for isolating before returning to work, school or .    If you DO have symptoms    Stay home and away from others     For at least 5 days after your symptoms started, AND    You are fever free for 24 hours (with no medicine that reduces fever), AND    Your other symptoms are better    Wear a mask for 10 full days anytime you are around others    If you DON'T have symptoms    Stay home and away from others for at least 5 days after your positive test    Wear a mask for 10 full days anytime you are around others    There may be different guidelines for healthcare facilities.  Please check with the specific sites before arriving.    If you have been told by a doctor that you were severely ill with COVID-19 or are immunocompromised, you should isolate for at least 10 days.    You should not go back to work until you meet the guidelines above for ending your home isolation. You don't need to be retested for COVID-19 before going back to work--studies show that you won't spread the virus if it's been at least 10 days since your symptoms started (or 20 days, if you have a weak immune system).    Employers, schools, and daycares: This is an official notice for this person's medical guidelines for returning in-person.  They must meet the above guidelines before going back to work, school or  in person.    You will receive a positive COVID-19 letter via [address] or the mail soon with additional self-care information.    Would you like me to review some of that information with you now?  No    If you were tested for an upcoming procedure, please contact your provider for next steps.    Brittany Davis   deformity

## 2023-11-19 ENCOUNTER — HEALTH MAINTENANCE LETTER (OUTPATIENT)
Age: 73
End: 2023-11-19

## 2024-06-16 ENCOUNTER — HEALTH MAINTENANCE LETTER (OUTPATIENT)
Age: 74
End: 2024-06-16

## 2024-12-20 ENCOUNTER — HOSPITAL ENCOUNTER (OUTPATIENT)
Dept: NUCLEAR MEDICINE | Facility: CLINIC | Age: 74
Setting detail: NUCLEAR MEDICINE
Discharge: HOME | End: 2024-12-20
Attending: FAMILY MEDICINE | Admitting: FAMILY MEDICINE
Payer: COMMERCIAL

## 2024-12-20 DIAGNOSIS — R13.10 PILL DYSPHAGIA: ICD-10-CM

## 2024-12-20 DIAGNOSIS — R68.81 EARLY SATIETY: ICD-10-CM

## 2024-12-20 DIAGNOSIS — R43.8 BAD ORAL TASTE: ICD-10-CM

## 2024-12-20 DIAGNOSIS — R19.4 CHANGE IN BOWEL HABITS: ICD-10-CM

## 2024-12-20 PROCEDURE — A9541 TC99M SULFUR COLLOID: HCPCS

## 2024-12-20 PROCEDURE — 343N000001 HC RX 343 MED OP 636

## 2024-12-20 PROCEDURE — 78264 GASTRIC EMPTYING IMG STUDY: CPT

## 2024-12-20 RX ADMIN — Medication 1 MILLICURIE: at 08:03

## 2025-02-27 ENCOUNTER — HOSPITAL ENCOUNTER (EMERGENCY)
Facility: CLINIC | Age: 75
Discharge: HOME | End: 2025-02-27
Attending: PHYSICIAN ASSISTANT
Payer: COMMERCIAL

## 2025-02-27 ENCOUNTER — APPOINTMENT (OUTPATIENT)
Dept: GENERAL RADIOLOGY | Facility: CLINIC | Age: 75
End: 2025-02-27
Attending: PHYSICIAN ASSISTANT
Payer: COMMERCIAL

## 2025-02-27 VITALS
HEART RATE: 84 BPM | RESPIRATION RATE: 22 BRPM | TEMPERATURE: 98.6 F | SYSTOLIC BLOOD PRESSURE: 145 MMHG | OXYGEN SATURATION: 97 % | DIASTOLIC BLOOD PRESSURE: 85 MMHG

## 2025-02-27 DIAGNOSIS — B33.8 RSV INFECTION: ICD-10-CM

## 2025-02-27 LAB
FLUAV RNA SPEC QL NAA+PROBE: NEGATIVE
FLUBV RNA RESP QL NAA+PROBE: NEGATIVE
RSV RNA SPEC NAA+PROBE: POSITIVE
SARS-COV-2 RNA RESP QL NAA+PROBE: NEGATIVE

## 2025-02-27 PROCEDURE — 87637 SARSCOV2&INF A&B&RSV AMP PRB: CPT | Performed by: NURSE PRACTITIONER

## 2025-02-27 PROCEDURE — 99213 OFFICE O/P EST LOW 20 MIN: CPT | Performed by: PHYSICIAN ASSISTANT

## 2025-02-27 PROCEDURE — 71046 X-RAY EXAM CHEST 2 VIEWS: CPT

## 2025-02-27 PROCEDURE — G0463 HOSPITAL OUTPT CLINIC VISIT: HCPCS | Mod: 25 | Performed by: PHYSICIAN ASSISTANT

## 2025-02-27 RX ORDER — BENZONATATE 100 MG/1
100 CAPSULE ORAL 3 TIMES DAILY PRN
Qty: 30 CAPSULE | Refills: 0 | Status: SHIPPED | OUTPATIENT
Start: 2025-02-27

## 2025-02-27 RX ORDER — ALBUTEROL SULFATE 90 UG/1
2 INHALANT RESPIRATORY (INHALATION) EVERY 6 HOURS PRN
Qty: 18 G | Refills: 0 | Status: SHIPPED | OUTPATIENT
Start: 2025-02-27

## 2025-02-27 ASSESSMENT — COLUMBIA-SUICIDE SEVERITY RATING SCALE - C-SSRS
1. IN THE PAST MONTH, HAVE YOU WISHED YOU WERE DEAD OR WISHED YOU COULD GO TO SLEEP AND NOT WAKE UP?: NO
6. HAVE YOU EVER DONE ANYTHING, STARTED TO DO ANYTHING, OR PREPARED TO DO ANYTHING TO END YOUR LIFE?: NO
2. HAVE YOU ACTUALLY HAD ANY THOUGHTS OF KILLING YOURSELF IN THE PAST MONTH?: NO

## 2025-02-27 ASSESSMENT — ACTIVITIES OF DAILY LIVING (ADL): ADLS_ACUITY_SCORE: 43

## 2025-02-27 NOTE — ED PROVIDER NOTES
History     Chief Complaint   Patient presents with    Cough     Cough, congestion since 2/4/2025  Patient states since Sunday patient has been experiencing a productive cough   Swollen and sore lymph nodes , shortness of breath , light headed with light activities      HPI  Abiola Rios is a 74 year old female who presents to urgent care with concerns over illness.  Patient states he became ill roughly 3 weeks ago with what she thought was a cold rhinorrhea, cough.  5 days prior to arrival while returned from a flight and cruise, she developed increasing nasal congestion, swollen glands, cough.  She denies any *** .  She states concern that she has shortness of breath, lightheadedness associated with it.  She denies any objective fever, chills, myalgias, worrisome rashes or skin changes.     Allergies:  Allergies   Allergen Reactions    Nka [No Known Allergies]      Problem List:    Patient Active Problem List    Diagnosis Date Noted    Anemia 06/30/2017     Priority: Medium    Muscle pain 06/28/2017     Priority: Medium    S/P total hip arthroplasty, left 06/28/2017     Priority: Medium    Cerebral aneurysm, nonruptured 01/05/2016     Priority: Medium     5 vessels, just monitoring for now      Systemic lupus erythematosus (H) 01/05/2016     Priority: Medium    Hyperlipidemia LDL goal <100 01/05/2016     Priority: Medium    AVM (arteriovenous malformation) brain 01/05/2016     Priority: Medium    Glaucoma 01/05/2016     Priority: Medium    Pigment dispersion syndrome 08/11/2014     Priority: Medium    Aneurysm 01/01/2014     Priority: Medium     Formatting of this note might be different from the original.  multiple intracranial - ha, sensory changes related?      Non-toxic multinodular goiter 11/23/2007     Priority: Medium     Overview:   negative FNA 11/07  Dr. Nelson recommends yearly ultrasound and TSH      Other and unspecified nonspecific immunological findings 09/24/2007     Priority: Medium      18-Oct-2022 Formatting of this note might be different from the original.  pos. АЛЕКСАНДР      Gastroesophageal reflux disease 09/20/2007     Priority: Medium    Irritable bowel syndrome 09/20/2007     Priority: Medium    History of colonic polyps 09/20/2007     Priority: Medium     Overview:   adenomatous          Past Medical History:    Past Medical History:   Diagnosis Date    AVM (arteriovenous malformation) brain     Cerebral aneurysm without rupture     Glaucoma     Hyperlipidemia     Lupus (H)        Past Surgical History:    Past Surgical History:   Procedure Laterality Date    ARTHROPLASTY HIP Left 6/28/2017    Procedure: ARTHROPLASTY HIP;  Left total hip arthroplasty choice anes;  Surgeon: Tyrell Pimentel MD;  Location: [facility] OR    ARTHROSCOPY SHOULDER, OPEN ROTATOR CUFF REPAIR, COMBINED Left 4/14/2016    Procedure: COMBINED ARTHROSCOPY SHOULDER, OPEN ROTATOR CUFF REPAIR;  Surgeon: Tyrell Pimentel MD;  Location: [facility] OR    COLONOSCOPY N/A 2/12/2015    Procedure: COLONOSCOPY;  Surgeon: Bipin Pride MD;  Location: [facility]    EXAM UNDER ANESTHESIA, MANIPULATE JOINT (LOCATION) Left 6/29/2016    Procedure: EXAM UNDER ANESTHESIA, MANIPULATE JOINT (LOCATION);  Surgeon: Tyrell Pimentel MD;  Location: [facility] OR    ORTHOPEDIC SURGERY         Family History:    Family History   Problem Relation Age of Onset    Dementia Mother     Heart Disease Maternal Grandmother     Heart Disease Maternal Grandfather     Respiratory Sister        Social History:  Marital Status:   [2]  Social History     Tobacco Use    Smoking status: Former     Current packs/day: 0.25     Average packs/day: 0.3 packs/day for 42.2 years (10.5 ttl pk-yrs)     Types: Cigarettes     Start date: 1/1/1983    Smokeless tobacco: Never   Substance Use Topics    Alcohol use: Yes     Comment: occational    Drug use: No        Medications:    atorvastatin (LIPITOR) 10 MG tablet  Cholecalciferol (VITAMIN D3 PO)  Cyanocobalamin (VITAMIN B 12 PO)  latanoprost (XALATAN)  "0.005 % ophthalmic solution  order for DME          Review of Systems    Physical Exam   BP: (!) 145/85  Pulse: 84  Temp: 98.6  F (37  C)  Resp: 22  SpO2: 97 %      Physical Exam    ED Course        Procedures         {EKG done?:770752}    Critical Care time:  {none or minutes:350380}  {Trauma Activation or Fall?:258156}  {Sepsis/Septic Shock/Stemi/Stroke:045603::\"None\"}         No results found for this or any previous visit (from the past 24 hours).    Medications - No data to display    Assessments & Plan (with Medical Decision Making)     I have reviewed the nursing notes.    I have reviewed the findings, diagnosis, plan and need for follow up with the patient.  {ED Addendum:049376::\" \"}        Medical Decision Making  The patient's presentation was of {[facility] Problem:299483}.    The patient's evaluation involved:  {[facility] Data:362253}    The patient's management necessitated {[facility] Management:431993}.        New Prescriptions    No medications on file       Final diagnoses:   None       2/27/2025   [facility] EMERGENCY DEPT    " SARS-CoV2, influenza, and RSV viruses in individuals with signs and symptoms of respiratory tract infection. This test is for in vitro diagnostic use under the [place] FDA for laboratories certified under CLIA to perform high or moderate complexity testing. This test has been [place] FDA cleared. A negative result does not rule out the presence of PCR inhibitors in the specimen or target RNA in concentration below the limit of detection for the assay. If only one viral target is positive but coinfection with multiple targets is suspected, the sample should be re-tested with another FDA cleared, approved, or authorized test, if coninfection would change clinical management. This test was validated by the [facility]. These laboratories are certified under the Clinical Laboratory Improvement Amendments of 1988 (CLIA-88) as qualified to perfom high complexity laboratory testing.     Medications - No data to display    Assessments & Plan (with Medical Decision Making)     I have reviewed the nursing notes.    I have reviewed the findings, diagnosis, plan and need for follow up with the patient.       Discharge Medication List as of 2/27/2025  1:39 PM        Final diagnoses:   RSV infection     74-year-old female presents to urgent care with concern over 3-week history of illness with increasing cough, nasal congestion, shortness of breath, lightheadedness within the last 5 days after returning from vacation.  She had elevated blood pressure upon arrival, remainder of vital signs stable.  Physical exam findings included lungs which are clear to auscultation without wheezing rales or rhonchi however given duration of symptoms did obtain chest x-ray which was negative for acute infiltrate.  She did ultimately test positive for RSV.  Negative for influenza, COVID-19.  Discussed RSV is typically self-limiting illness discharged home stable with instructions for continued symptomatic treatment.  Signs of respiratory  distress, worrisome reasons to return to the ER/UC discussed.    Disclaimer: This note consists of symbols derived from keyboarding, dictation, and/or voice recognition software. As a result, there may be errors in the script that have gone undetected.  Please consider this when interpreting information found in the chart.      2/27/2025   [facility] EMERGENCY DEPT       Radha Joyner PA-C  03/05/25 [id number]

## 2025-04-25 ENCOUNTER — HOSPITAL ENCOUNTER (EMERGENCY)
Facility: CLINIC | Age: 75
Discharge: HOME | End: 2025-04-25
Attending: NURSE PRACTITIONER | Admitting: NURSE PRACTITIONER
Payer: COMMERCIAL

## 2025-04-25 VITALS
TEMPERATURE: 98.2 F | HEART RATE: 73 BPM | OXYGEN SATURATION: 97 % | RESPIRATION RATE: 18 BRPM | WEIGHT: 145 LBS | DIASTOLIC BLOOD PRESSURE: 88 MMHG | BODY MASS INDEX: 21.41 KG/M2 | SYSTOLIC BLOOD PRESSURE: 151 MMHG

## 2025-04-25 DIAGNOSIS — U07.1 COVID-19 VIRUS INFECTION: ICD-10-CM

## 2025-04-25 LAB
FLUAV RNA SPEC QL NAA+PROBE: NEGATIVE
FLUBV RNA RESP QL NAA+PROBE: NEGATIVE
RSV RNA SPEC NAA+PROBE: NEGATIVE
SARS-COV-2 RNA RESP QL NAA+PROBE: POSITIVE

## 2025-04-25 PROCEDURE — 87637 SARSCOV2&INF A&B&RSV AMP PRB: CPT | Performed by: NURSE PRACTITIONER

## 2025-04-25 PROCEDURE — G0463 HOSPITAL OUTPT CLINIC VISIT: HCPCS

## 2025-04-25 PROCEDURE — 99213 OFFICE O/P EST LOW 20 MIN: CPT | Performed by: NURSE PRACTITIONER

## 2025-04-25 ASSESSMENT — COLUMBIA-SUICIDE SEVERITY RATING SCALE - C-SSRS
1. IN THE PAST MONTH, HAVE YOU WISHED YOU WERE DEAD OR WISHED YOU COULD GO TO SLEEP AND NOT WAKE UP?: NO
2. HAVE YOU ACTUALLY HAD ANY THOUGHTS OF KILLING YOURSELF IN THE PAST MONTH?: NO
6. HAVE YOU EVER DONE ANYTHING, STARTED TO DO ANYTHING, OR PREPARED TO DO ANYTHING TO END YOUR LIFE?: NO

## 2025-04-25 NOTE — ED PROVIDER NOTES
Chief Complaint:   No chief complaint on file.        HPI:   Abiola Rios is a 74 year old female who presents to the UC/ED with a 3 day history of sore throat, congestion, post nasal drip, sinus pressure, non productive cough, achiness, and fevers.  She has tried ibuprofen and Tylenol with improvement of fever symptoms.  She denies diarrhea, nausea, and vomiting.  She has been exposed to ill contacts.   Medical history/ Predisposing factors include:of: Patient was sick in February with RSV.  Has history of cerebral aneurysm, anemia, goiter and GERD.      Problem List:    Patient Active Problem List    Diagnosis Date Noted    Anemia 06/30/2017     Priority: Medium    Muscle pain 06/28/2017     Priority: Medium    S/P total hip arthroplasty, left 06/28/2017     Priority: Medium    Cerebral aneurysm, nonruptured 01/05/2016     Priority: Medium     5 vessels, just monitoring for now      Systemic lupus erythematosus (H) 01/05/2016     Priority: Medium    Hyperlipidemia LDL goal <100 01/05/2016     Priority: Medium    AVM (arteriovenous malformation) brain 01/05/2016     Priority: Medium    Glaucoma 01/05/2016     Priority: Medium    Pigment dispersion syndrome 08/11/2014     Priority: Medium    Aneurysm 01/01/2014     Priority: Medium     Formatting of this note might be different from the original.  multiple intracranial - ha, sensory changes related?      Non-toxic multinodular goiter 11/23/2007     Priority: Medium     Overview:   negative FNA 11/07  Dr. Nelson recommends yearly ultrasound and TSH      Other and unspecified nonspecific immunological findings 09/24/2007     Priority: Medium     Formatting of this note might be different from the original.  pos. АЛЕКСАНДР      Gastroesophageal reflux disease 09/20/2007     Priority: Medium    Irritable bowel syndrome 09/20/2007     Priority: Medium    History of colonic polyps 09/20/2007     Priority: Medium     Overview:   adenomatous          Past Medical History:     Past Medical History:   Diagnosis Date    AVM (arteriovenous malformation) brain     Cerebral aneurysm without rupture     Glaucoma     Hyperlipidemia     Lupus (H)        Past Surgical History:    Past Surgical History:   Procedure Laterality Date    ARTHROPLASTY HIP Left 6/28/2017    Procedure: ARTHROPLASTY HIP;  Left total hip arthroplasty choice anes;  Surgeon: Tyrell Pimentel MD;  Location: [facility]    ARTHROSCOPY SHOULDER, OPEN ROTATOR CUFF REPAIR, COMBINED Left 4/14/2016    Procedure: COMBINED ARTHROSCOPY SHOULDER, OPEN ROTATOR CUFF REPAIR;  Surgeon: Tyrell Pimentel MD;  Location: [facility]    COLONOSCOPY N/A 2/12/2015    Procedure: COLONOSCOPY;  Surgeon: Bipin Pride MD;  Location: [facility]    EXAM UNDER ANESTHESIA, MANIPULATE JOINT (LOCATION) Left 6/29/2016    Procedure: EXAM UNDER ANESTHESIA, MANIPULATE JOINT (LOCATION);  Surgeon: Tyrell Pimentel MD;  Location: [facility]    ORTHOPEDIC SURGERY           Meds:   Current Outpatient Medications   Medication Sig Dispense Refill    albuterol (PROAIR HFA/PROVENTIL HFA/VENTOLIN HFA) 108 (90 Base) MCG/ACT inhaler Inhale 2 puffs into the lungs every 6 hours as needed. 18 g 0    atorvastatin (LIPITOR) 10 MG tablet Take 10 mg by mouth      benzonatate (TESSALON) 100 MG capsule Take 1 capsule (100 mg) by mouth 3 times daily as needed for cough. 30 capsule 0    Cholecalciferol (VITAMIN D3 PO) Take 1,000 Units by mouth daily      Cyanocobalamin (VITAMIN B 12 PO) Take 500 mcg by mouth daily      latanoprost (XALATAN) 0.005 % ophthalmic solution Place 1 drop into both eyes daily      order for DME Equipment being ordered: Walker Wheels ()  Treatment Diagnosis: L STEWART 1 Units 0       Allergies:   Allergies   Allergen Reactions    Nka [No Known Allergies]        Medications updated and reviewed.  Past, family and surgical history is updated and reviewed in the record.     Review of Systems:  General: see HPI  HEENT: see HPI  Respiratory: see HPI    Physical Exam:   There  were no vitals taken for this visit.     General: No acute distress on arrival  Head: normocephalic, non-traumatic.  Eyes: Non-reddened conjunctiva, no icterus, noninjected, normal pupillary response to light accommodation bilaterally.  Ears: Left ear: TM intact, middle ear non-erythemic, no purulence, canal non-erythemic, patent. Right ear: TM intact, middle ear non-erythemic without purulence, canal non-erythremic, patent.  Nose: Non-erythemic, no purulence present no edema, patent nostrils.  Mouth/Throat: No oral lesions, moist mucous membranes, Non-erythemic, midline uvula no exudates present.  No cervical adenopathy present..  CV: Regular rate and rhythm, no cyanosis.  Respiratory: Nonlabored, CTA bilateral throughout.   Abdomen: NT, ND, normal bowel sounds present  Skin: No rashes, lesions, normal color.  Neuro: Normal, active, age-appropriate. Normal response to verbal stimuli. No obvious focal deficits.      Medical Decision Making:  Upper respiratory infection symptoms with Normal vitals.  CXR is not indicated.  Rapid Strep test is not indicated. COVID/RSV/Influenza A and B testing is    Results for orders placed or performed during the hospital encounter of 04/25/25 (from the past 48 hours)   Influenza A/B, RSV and SARS-CoV2 PCR (COVID-19) Nose    Specimen: Nose; Swab   Result Value Ref Range    Influenza A PCR Negative Negative    Influenza B PCR Negative Negative    RSV PCR Negative Negative    SARS CoV2 PCR Positive (A) Negative    Narrative    Testing was performed using the Xpert Xpress CoV2/Flu/RSV Assay on the Stop Being Watched GeneXpert Instrument. This test should be ordered for the detection of SARS-CoV2, influenza, and RSV viruses in individuals with signs and symptoms of respiratory tract infection. This test is for in vitro diagnostic use under the [place] FDA for laboratories certified under CLIA to perform high or moderate complexity testing. This test has been [place] FDA cleared. A negative result does not  rule out the presence of PCR inhibitors in the specimen or target RNA in concentration below the limit of detection for the assay. If only one viral target is positive but coinfection with multiple targets is suspected, the sample should be re-tested with another FDA cleared, approved, or authorized test, if coninfection would change clinical management. This test was validated by the [facility]. These laboratories are certified under the Clinical Laboratory Improvement Amendments of 1988 (CLIA-88) as qualified to perfom high complexity laboratory testing.       Assessment:  COVID testing is positive, and has had symptoms for at least 3 days discussed symptom management with Tessalon for coughing.  Patient declined Paxlovid for treatment.   Recommend increase oral fluid intake manage fevers or pain with Tylenol.    Differentials: RSV, influenza testing was negative    Plan:   She has Tessalon for coughing, Declined Paxlovid antiviral treatment.       Reassurance given regarding diagnosis and recommendations.  Discussed home treatment with antipyretics Prescriptions .  Recommend follow up in primary care as needed, or sooner if symptoms persist. Return to the ED with fever, trouble swallowing or breathing, or any other concerns.         MEDICATIONS GIVEN IN THE EMERGENCY DEPARTMENT:  Medications - No data to display      I have reviewed the nursing notes.  I have reviewed the findings, diagnosis, plan and need for follow up with the patient.        NEW PRESCRIPTIONS STARTED AT TODAY'S ER VISIT  New Prescriptions    No medications on file       Final diagnoses:   COVID-19 virus infection       4/25/2025   [facility] EMERGENCY DEPT  Condition on disposition: Stable    Recommended supportive cares to aid with symptoms including nasal congestion, sinus congestion, cough for 3 days. Advised that if symptoms are not improving despite this treatment plan, then they should follow-up with  primary provider for reevaluation. Strict UC/ED return precautions discussed in detail including prolonged fevers, development of shortness of breath or trouble with breathing, weakness or lightheadedness, inability to tolerate oral intake or anything else that is concerning to them. All questions were answered. Pt verbalized understanding and agreement with the above plan.     Patient verbalized agreement with and understanding of the rational for the diagnosis and treatment plan.  All questions were answered to best of my ability and the patient's satisfaction. The patient was advised to contact or return to their primary clinic or UC/ED with any questions that may arise after this visit.      Disclaimer: This note consists of symbols derived from keyboarding, dictation, and/or voice recognition software. As a result, there may be errors in the script that have gone undetected.  Please consider this when interpreting information found in the chart.        Tessa Voss, [place] CNP  04/25/25 [id number]

## 2025-05-31 ENCOUNTER — HEALTH MAINTENANCE LETTER (OUTPATIENT)
Age: 75
End: 2025-05-31

## (undated) DEVICE — PREP DURAPREP 26ML APL 8630

## (undated) DEVICE — SU ETHIBOND 1 CT-1 30" X425H

## (undated) DEVICE — IMM PILLOW ABDUCT HIP MED 0814-8033

## (undated) DEVICE — SYR 50ML LL W/O NDL 309653

## (undated) DEVICE — BLADE KNIFE SURG 15 371115

## (undated) DEVICE — BONE CLEANING TIP INTERPULSE  0210-010-000

## (undated) DEVICE — SOL NACL 0.9% IRRIG 1000ML BOTTLE 07138-09

## (undated) DEVICE — PACK TOTAL HIP W/POUCH RIVERSIDE LATEX FREE

## (undated) DEVICE — ESU ELEC BLADE 4" COATED

## (undated) DEVICE — SOL NACL 0.9% IRRIG 3000ML BAG 07972-08

## (undated) DEVICE — GLOVE PROTEXIS W/NEU-THERA 8.0  2D73TE80

## (undated) DEVICE — GLOVE PROTEXIS BLUE W/NEU-THERA 7.5  2D73EB75

## (undated) DEVICE — SU DERMABOND PRINEO 22CM CLR222US

## (undated) DEVICE — TAPE CLOTH ADHESIVE 3" ZONAS

## (undated) DEVICE — SUCTION TIP FLEXI CLEAR TIP DISP K62

## (undated) DEVICE — SUCTION IRR SYSTEM W/O TIP INTERPULSE HANDPIECE 0210-100-000

## (undated) DEVICE — BLANKET BAIR HUGGER UPPER BODY 42268

## (undated) DEVICE — HOOD T4 PROTECTIVE STERI FACE SHIELD 400-800

## (undated) DEVICE — DRAPE SHEET REV FOLD 3/4 9349

## (undated) DEVICE — SU VICRYL 2-0 CT-1 36" UND J945H

## (undated) DEVICE — CATH TRAY FOLEY 16FR SILICONE 907416

## (undated) DEVICE — GLOVE PROTEXIS W/NEU-THERA 7.5  2D73TE75

## (undated) DEVICE — SOL WATER IRRIG 1000ML BOTTLE 07139-09

## (undated) DEVICE — KIT DRAIN CLOSED WOUND SUCTION MED 400ML RESVR

## (undated) DEVICE — GLOVE PROTEXIS BLUE W/NEU-THERA 8.5  2D73EB85

## (undated) DEVICE — NDL 18GA 1.5" 305196

## (undated) DEVICE — DEVICE RETRIEVER HEWSON 71111579

## (undated) DEVICE — BLADE SAW SAGITTAL STRK 19.5X90X1.27MM 2108-109-000S11

## (undated) DEVICE — DRAPE STERI TOWEL SM 1000

## (undated) DEVICE — GOWN IMPERVIOUS SPECIALTY XLG/XLONG 32474

## (undated) DEVICE — SU STRATAFIX 3-0 MH 12" PS-2 SXMD1B103

## (undated) DEVICE — GOWN XLG DISP 9545

## (undated) DEVICE — BLADE CLIPPER 4406

## (undated) DEVICE — SU VICRYL 1 CT-1 36" UND J947H

## (undated) DEVICE — SU FIBERWIRE 2 38" T-8 NDL  AR-7206

## (undated) RX ORDER — PROPOFOL 10 MG/ML
INJECTION, EMULSION INTRAVENOUS
Status: DISPENSED
Start: 2017-06-28

## (undated) RX ORDER — ONDANSETRON 2 MG/ML
INJECTION INTRAMUSCULAR; INTRAVENOUS
Status: DISPENSED
Start: 2017-06-28

## (undated) RX ORDER — DEXAMETHASONE SODIUM PHOSPHATE 4 MG/ML
INJECTION, SOLUTION INTRA-ARTICULAR; INTRALESIONAL; INTRAMUSCULAR; INTRAVENOUS; SOFT TISSUE
Status: DISPENSED
Start: 2017-06-28

## (undated) RX ORDER — FENTANYL CITRATE 50 UG/ML
INJECTION, SOLUTION INTRAMUSCULAR; INTRAVENOUS
Status: DISPENSED
Start: 2017-06-28

## (undated) RX ORDER — OXYCODONE HCL 10 MG/1
TABLET, FILM COATED, EXTENDED RELEASE ORAL
Status: DISPENSED
Start: 2017-06-28

## (undated) RX ORDER — EPINEPHRINE 1 MG/ML
INJECTION, SOLUTION, CONCENTRATE INTRAVENOUS
Status: DISPENSED
Start: 2018-07-13

## (undated) RX ORDER — TRIAMCINOLONE ACETONIDE 40 MG/ML
INJECTION, SUSPENSION INTRA-ARTICULAR; INTRAMUSCULAR
Status: DISPENSED
Start: 2018-07-13

## (undated) RX ORDER — LIDOCAINE HYDROCHLORIDE 10 MG/ML
INJECTION, SOLUTION EPIDURAL; INFILTRATION; INTRACAUDAL; PERINEURAL
Status: DISPENSED
Start: 2018-07-13

## (undated) RX ORDER — GABAPENTIN 100 MG/1
CAPSULE ORAL
Status: DISPENSED
Start: 2017-06-28